# Patient Record
Sex: MALE | Race: WHITE | NOT HISPANIC OR LATINO | Employment: OTHER | URBAN - METROPOLITAN AREA
[De-identification: names, ages, dates, MRNs, and addresses within clinical notes are randomized per-mention and may not be internally consistent; named-entity substitution may affect disease eponyms.]

---

## 2017-01-17 ENCOUNTER — COMMUNICATION - HEALTHEAST (OUTPATIENT)
Dept: INTERNAL MEDICINE | Facility: CLINIC | Age: 52
End: 2017-01-17

## 2017-01-30 ENCOUNTER — OFFICE VISIT - HEALTHEAST (OUTPATIENT)
Dept: INTERNAL MEDICINE | Facility: CLINIC | Age: 52
End: 2017-01-30

## 2017-01-30 DIAGNOSIS — Z00.00 ROUTINE GENERAL MEDICAL EXAMINATION AT A HEALTH CARE FACILITY: ICD-10-CM

## 2017-01-30 LAB
CHOLEST SERPL-MCNC: 171 MG/DL
FASTING STATUS PATIENT QL REPORTED: YES
HDLC SERPL-MCNC: 37 MG/DL
LDLC SERPL CALC-MCNC: 82 MG/DL
PSA SERPL-MCNC: 1.2 NG/ML (ref 0–3.5)
TRIGL SERPL-MCNC: 262 MG/DL

## 2017-01-30 ASSESSMENT — MIFFLIN-ST. JEOR: SCORE: 2284.92

## 2017-01-31 ENCOUNTER — COMMUNICATION - HEALTHEAST (OUTPATIENT)
Dept: INTERNAL MEDICINE | Facility: CLINIC | Age: 52
End: 2017-01-31

## 2017-02-20 ENCOUNTER — COMMUNICATION - HEALTHEAST (OUTPATIENT)
Dept: INTERNAL MEDICINE | Facility: CLINIC | Age: 52
End: 2017-02-20

## 2017-03-10 ENCOUNTER — COMMUNICATION - HEALTHEAST (OUTPATIENT)
Dept: INTERNAL MEDICINE | Facility: CLINIC | Age: 52
End: 2017-03-10

## 2017-03-10 DIAGNOSIS — Q77.5: ICD-10-CM

## 2017-03-10 DIAGNOSIS — I10 HTN (HYPERTENSION): ICD-10-CM

## 2017-03-14 ENCOUNTER — COMMUNICATION - HEALTHEAST (OUTPATIENT)
Dept: INTERNAL MEDICINE | Facility: CLINIC | Age: 52
End: 2017-03-14

## 2017-03-14 DIAGNOSIS — Q77.5: ICD-10-CM

## 2017-03-22 ENCOUNTER — COMMUNICATION - HEALTHEAST (OUTPATIENT)
Dept: INTERNAL MEDICINE | Facility: CLINIC | Age: 52
End: 2017-03-22

## 2017-03-22 DIAGNOSIS — R06.81 APNEA: ICD-10-CM

## 2017-04-14 ENCOUNTER — RECORDS - HEALTHEAST (OUTPATIENT)
Dept: ADMINISTRATIVE | Facility: OTHER | Age: 52
End: 2017-04-14

## 2017-05-10 ENCOUNTER — COMMUNICATION - HEALTHEAST (OUTPATIENT)
Dept: INTERNAL MEDICINE | Facility: CLINIC | Age: 52
End: 2017-05-10

## 2017-05-19 ENCOUNTER — COMMUNICATION - HEALTHEAST (OUTPATIENT)
Dept: INTERNAL MEDICINE | Facility: CLINIC | Age: 52
End: 2017-05-19

## 2017-11-06 ENCOUNTER — COMMUNICATION - HEALTHEAST (OUTPATIENT)
Dept: INTERNAL MEDICINE | Facility: CLINIC | Age: 52
End: 2017-11-06

## 2017-11-08 ENCOUNTER — COMMUNICATION - HEALTHEAST (OUTPATIENT)
Dept: INTERNAL MEDICINE | Facility: CLINIC | Age: 52
End: 2017-11-08

## 2018-01-04 ENCOUNTER — COMMUNICATION - HEALTHEAST (OUTPATIENT)
Dept: INTERNAL MEDICINE | Facility: CLINIC | Age: 53
End: 2018-01-04

## 2018-01-05 ENCOUNTER — COMMUNICATION - HEALTHEAST (OUTPATIENT)
Dept: INTERNAL MEDICINE | Facility: CLINIC | Age: 53
End: 2018-01-05

## 2018-01-08 ENCOUNTER — RECORDS - HEALTHEAST (OUTPATIENT)
Dept: ADMINISTRATIVE | Facility: OTHER | Age: 53
End: 2018-01-08

## 2018-01-10 ENCOUNTER — COMMUNICATION - HEALTHEAST (OUTPATIENT)
Dept: INTERNAL MEDICINE | Facility: CLINIC | Age: 53
End: 2018-01-10

## 2018-01-17 ENCOUNTER — COMMUNICATION - HEALTHEAST (OUTPATIENT)
Dept: INTERNAL MEDICINE | Facility: CLINIC | Age: 53
End: 2018-01-17

## 2018-02-11 ENCOUNTER — COMMUNICATION - HEALTHEAST (OUTPATIENT)
Dept: INTERNAL MEDICINE | Facility: CLINIC | Age: 53
End: 2018-02-11

## 2018-02-11 DIAGNOSIS — E78.5 HLD (HYPERLIPIDEMIA): ICD-10-CM

## 2018-02-14 ENCOUNTER — RECORDS - HEALTHEAST (OUTPATIENT)
Dept: ADMINISTRATIVE | Facility: OTHER | Age: 53
End: 2018-02-14

## 2018-03-19 ENCOUNTER — OFFICE VISIT - HEALTHEAST (OUTPATIENT)
Dept: INTERNAL MEDICINE | Facility: CLINIC | Age: 53
End: 2018-03-19

## 2018-03-19 DIAGNOSIS — Z00.00 ROUTINE GENERAL MEDICAL EXAMINATION AT A HEALTH CARE FACILITY: ICD-10-CM

## 2018-03-19 LAB
ALBUMIN SERPL-MCNC: 4.1 G/DL (ref 3.5–5)
ALBUMIN UR-MCNC: ABNORMAL MG/DL
ALP SERPL-CCNC: 57 U/L (ref 45–120)
ALT SERPL W P-5'-P-CCNC: 19 U/L (ref 0–45)
ANION GAP SERPL CALCULATED.3IONS-SCNC: 11 MMOL/L (ref 5–18)
APPEARANCE UR: CLEAR
AST SERPL W P-5'-P-CCNC: 18 U/L (ref 0–40)
BACTERIA #/AREA URNS HPF: ABNORMAL HPF
BILIRUB SERPL-MCNC: 0.7 MG/DL (ref 0–1)
BILIRUB UR QL STRIP: ABNORMAL
BUN SERPL-MCNC: 15 MG/DL (ref 8–22)
CALCIUM SERPL-MCNC: 9.5 MG/DL (ref 8.5–10.5)
CHLORIDE BLD-SCNC: 105 MMOL/L (ref 98–107)
CHOLEST SERPL-MCNC: 168 MG/DL
CO2 SERPL-SCNC: 23 MMOL/L (ref 22–31)
COLOR UR AUTO: YELLOW
CREAT SERPL-MCNC: 1.05 MG/DL (ref 0.7–1.3)
ERYTHROCYTE [DISTWIDTH] IN BLOOD BY AUTOMATED COUNT: 11.5 % (ref 11–14.5)
FASTING STATUS PATIENT QL REPORTED: ABNORMAL
GFR SERPL CREATININE-BSD FRML MDRD: >60 ML/MIN/1.73M2
GLUCOSE BLD-MCNC: 101 MG/DL (ref 70–125)
GLUCOSE UR STRIP-MCNC: NEGATIVE MG/DL
HBA1C MFR BLD: 5.5 % (ref 3.5–6)
HCT VFR BLD AUTO: 42.5 % (ref 40–54)
HDLC SERPL-MCNC: 35 MG/DL
HGB BLD-MCNC: 14.3 G/DL (ref 14–18)
HGB UR QL STRIP: NEGATIVE
KETONES UR STRIP-MCNC: NEGATIVE MG/DL
LDLC SERPL CALC-MCNC: 76 MG/DL
LEUKOCYTE ESTERASE UR QL STRIP: NEGATIVE
MCH RBC QN AUTO: 31.5 PG (ref 27–34)
MCHC RBC AUTO-ENTMCNC: 33.7 G/DL (ref 32–36)
MCV RBC AUTO: 93 FL (ref 80–100)
MUCOUS THREADS #/AREA URNS LPF: ABNORMAL LPF
NITRATE UR QL: NEGATIVE
PH UR STRIP: 6 [PH] (ref 5–8)
PLATELET # BLD AUTO: 231 THOU/UL (ref 140–440)
PMV BLD AUTO: 7.7 FL (ref 7–10)
POTASSIUM BLD-SCNC: 4.2 MMOL/L (ref 3.5–5)
PROT SERPL-MCNC: 7.1 G/DL (ref 6–8)
PSA SERPL-MCNC: 1.9 NG/ML (ref 0–3.5)
RBC # BLD AUTO: 4.55 MILL/UL (ref 4.4–6.2)
RBC #/AREA URNS AUTO: ABNORMAL HPF
SODIUM SERPL-SCNC: 139 MMOL/L (ref 136–145)
SP GR UR STRIP: 1.02 (ref 1–1.03)
SQUAMOUS #/AREA URNS AUTO: ABNORMAL LPF
TRIGL SERPL-MCNC: 283 MG/DL
TSH SERPL DL<=0.005 MIU/L-ACNC: 1.81 UIU/ML (ref 0.3–5)
UROBILINOGEN UR STRIP-ACNC: ABNORMAL
WBC #/AREA URNS AUTO: ABNORMAL HPF
WBC: 5.7 THOU/UL (ref 4–11)

## 2018-03-19 ASSESSMENT — MIFFLIN-ST. JEOR: SCORE: 2203.85

## 2018-03-20 ENCOUNTER — COMMUNICATION - HEALTHEAST (OUTPATIENT)
Dept: INTERNAL MEDICINE | Facility: CLINIC | Age: 53
End: 2018-03-20

## 2018-04-09 ENCOUNTER — COMMUNICATION - HEALTHEAST (OUTPATIENT)
Dept: INTERNAL MEDICINE | Facility: CLINIC | Age: 53
End: 2018-04-09

## 2018-04-11 ENCOUNTER — COMMUNICATION - HEALTHEAST (OUTPATIENT)
Dept: INTERNAL MEDICINE | Facility: CLINIC | Age: 53
End: 2018-04-11

## 2018-04-16 ENCOUNTER — RECORDS - HEALTHEAST (OUTPATIENT)
Dept: ADMINISTRATIVE | Facility: OTHER | Age: 53
End: 2018-04-16

## 2018-05-11 ENCOUNTER — COMMUNICATION - HEALTHEAST (OUTPATIENT)
Dept: INTERNAL MEDICINE | Facility: CLINIC | Age: 53
End: 2018-05-11

## 2018-05-11 DIAGNOSIS — I10 HTN (HYPERTENSION): ICD-10-CM

## 2018-05-11 DIAGNOSIS — E78.5 HLD (HYPERLIPIDEMIA): ICD-10-CM

## 2018-07-30 ENCOUNTER — COMMUNICATION - HEALTHEAST (OUTPATIENT)
Dept: INTERNAL MEDICINE | Facility: CLINIC | Age: 53
End: 2018-07-30

## 2018-11-05 ENCOUNTER — COMMUNICATION - HEALTHEAST (OUTPATIENT)
Dept: INTERNAL MEDICINE | Facility: CLINIC | Age: 53
End: 2018-11-05

## 2018-12-07 ENCOUNTER — RECORDS - HEALTHEAST (OUTPATIENT)
Dept: ADMINISTRATIVE | Facility: OTHER | Age: 53
End: 2018-12-07

## 2018-12-31 ENCOUNTER — COMMUNICATION - HEALTHEAST (OUTPATIENT)
Dept: INTERNAL MEDICINE | Facility: CLINIC | Age: 53
End: 2018-12-31

## 2018-12-31 DIAGNOSIS — N52.9 ED (ERECTILE DYSFUNCTION): ICD-10-CM

## 2019-02-22 ENCOUNTER — COMMUNICATION - HEALTHEAST (OUTPATIENT)
Dept: INTERNAL MEDICINE | Facility: CLINIC | Age: 54
End: 2019-02-22

## 2019-02-22 DIAGNOSIS — E78.5 HLD (HYPERLIPIDEMIA): ICD-10-CM

## 2019-03-19 ENCOUNTER — COMMUNICATION - HEALTHEAST (OUTPATIENT)
Dept: INTERNAL MEDICINE | Facility: CLINIC | Age: 54
End: 2019-03-19

## 2019-03-19 ENCOUNTER — OFFICE VISIT - HEALTHEAST (OUTPATIENT)
Dept: INTERNAL MEDICINE | Facility: CLINIC | Age: 54
End: 2019-03-19

## 2019-03-19 DIAGNOSIS — Z00.00 ROUTINE GENERAL MEDICAL EXAMINATION AT A HEALTH CARE FACILITY: ICD-10-CM

## 2019-03-19 LAB
ALBUMIN SERPL-MCNC: 4.3 G/DL (ref 3.5–5)
ALBUMIN UR-MCNC: NEGATIVE MG/DL
ALP SERPL-CCNC: 61 U/L (ref 45–120)
ALT SERPL W P-5'-P-CCNC: 16 U/L (ref 0–45)
ANION GAP SERPL CALCULATED.3IONS-SCNC: 11 MMOL/L (ref 5–18)
APPEARANCE UR: CLEAR
AST SERPL W P-5'-P-CCNC: 18 U/L (ref 0–40)
BILIRUB SERPL-MCNC: 0.7 MG/DL (ref 0–1)
BILIRUB UR QL STRIP: NEGATIVE
BUN SERPL-MCNC: 17 MG/DL (ref 8–22)
CALCIUM SERPL-MCNC: 9.6 MG/DL (ref 8.5–10.5)
CHLORIDE BLD-SCNC: 103 MMOL/L (ref 98–107)
CHOLEST SERPL-MCNC: 199 MG/DL
CO2 SERPL-SCNC: 25 MMOL/L (ref 22–31)
COLOR UR AUTO: YELLOW
CREAT SERPL-MCNC: 1.07 MG/DL (ref 0.7–1.3)
ERYTHROCYTE [DISTWIDTH] IN BLOOD BY AUTOMATED COUNT: 11.7 % (ref 11–14.5)
FASTING STATUS PATIENT QL REPORTED: YES
GFR SERPL CREATININE-BSD FRML MDRD: >60 ML/MIN/1.73M2
GLUCOSE BLD-MCNC: 107 MG/DL (ref 70–125)
GLUCOSE UR STRIP-MCNC: NEGATIVE MG/DL
HCT VFR BLD AUTO: 44.1 % (ref 40–54)
HDLC SERPL-MCNC: 42 MG/DL
HGB BLD-MCNC: 15.4 G/DL (ref 14–18)
HGB UR QL STRIP: NEGATIVE
KETONES UR STRIP-MCNC: NEGATIVE MG/DL
LDLC SERPL CALC-MCNC: 105 MG/DL
LEUKOCYTE ESTERASE UR QL STRIP: NEGATIVE
MCH RBC QN AUTO: 31.9 PG (ref 27–34)
MCHC RBC AUTO-ENTMCNC: 35 G/DL (ref 32–36)
MCV RBC AUTO: 91 FL (ref 80–100)
NITRATE UR QL: NEGATIVE
PH UR STRIP: 7.5 [PH] (ref 5–8)
PLATELET # BLD AUTO: 260 THOU/UL (ref 140–440)
PMV BLD AUTO: 7.7 FL (ref 7–10)
POTASSIUM BLD-SCNC: 4 MMOL/L (ref 3.5–5)
PROT SERPL-MCNC: 7.3 G/DL (ref 6–8)
PSA SERPL-MCNC: 2.1 NG/ML (ref 0–3.5)
RBC # BLD AUTO: 4.84 MILL/UL (ref 4.4–6.2)
SODIUM SERPL-SCNC: 139 MMOL/L (ref 136–145)
SP GR UR STRIP: 1.02 (ref 1–1.03)
TRIGL SERPL-MCNC: 259 MG/DL
TSH SERPL DL<=0.005 MIU/L-ACNC: 1.78 UIU/ML (ref 0.3–5)
UROBILINOGEN UR STRIP-ACNC: NORMAL
WBC: 6.2 THOU/UL (ref 4–11)

## 2019-03-19 ASSESSMENT — MIFFLIN-ST. JEOR: SCORE: 2171.52

## 2019-03-21 ENCOUNTER — COMMUNICATION - HEALTHEAST (OUTPATIENT)
Dept: INTERNAL MEDICINE | Facility: CLINIC | Age: 54
End: 2019-03-21

## 2019-03-21 DIAGNOSIS — N52.9 ED (ERECTILE DYSFUNCTION): ICD-10-CM

## 2019-03-25 ENCOUNTER — COMMUNICATION - HEALTHEAST (OUTPATIENT)
Dept: INTERNAL MEDICINE | Facility: CLINIC | Age: 54
End: 2019-03-25

## 2019-03-25 ENCOUNTER — AMBULATORY - HEALTHEAST (OUTPATIENT)
Dept: INTERNAL MEDICINE | Facility: CLINIC | Age: 54
End: 2019-03-25

## 2019-03-25 DIAGNOSIS — R19.7 DIARRHEA: ICD-10-CM

## 2019-04-16 ENCOUNTER — RECORDS - HEALTHEAST (OUTPATIENT)
Dept: ADMINISTRATIVE | Facility: OTHER | Age: 54
End: 2019-04-16

## 2019-04-23 ENCOUNTER — COMMUNICATION - HEALTHEAST (OUTPATIENT)
Dept: INTERNAL MEDICINE | Facility: CLINIC | Age: 54
End: 2019-04-23

## 2019-04-24 ENCOUNTER — COMMUNICATION - HEALTHEAST (OUTPATIENT)
Dept: INTERNAL MEDICINE | Facility: CLINIC | Age: 54
End: 2019-04-24

## 2019-04-24 DIAGNOSIS — N52.9 ED (ERECTILE DYSFUNCTION): ICD-10-CM

## 2019-05-17 ENCOUNTER — COMMUNICATION - HEALTHEAST (OUTPATIENT)
Dept: INTERNAL MEDICINE | Facility: CLINIC | Age: 54
End: 2019-05-17

## 2019-05-17 DIAGNOSIS — N52.9 ED (ERECTILE DYSFUNCTION): ICD-10-CM

## 2019-05-18 ENCOUNTER — COMMUNICATION - HEALTHEAST (OUTPATIENT)
Dept: INTERNAL MEDICINE | Facility: CLINIC | Age: 54
End: 2019-05-18

## 2019-05-18 DIAGNOSIS — N52.9 ED (ERECTILE DYSFUNCTION): ICD-10-CM

## 2019-05-20 ENCOUNTER — COMMUNICATION - HEALTHEAST (OUTPATIENT)
Dept: INTERNAL MEDICINE | Facility: CLINIC | Age: 54
End: 2019-05-20

## 2019-05-20 DIAGNOSIS — N52.9 ED (ERECTILE DYSFUNCTION): ICD-10-CM

## 2019-05-28 ENCOUNTER — COMMUNICATION - HEALTHEAST (OUTPATIENT)
Dept: INTERNAL MEDICINE | Facility: CLINIC | Age: 54
End: 2019-05-28

## 2019-05-28 DIAGNOSIS — E78.5 HLD (HYPERLIPIDEMIA): ICD-10-CM

## 2019-05-28 DIAGNOSIS — I10 HTN (HYPERTENSION): ICD-10-CM

## 2019-07-20 ENCOUNTER — COMMUNICATION - HEALTHEAST (OUTPATIENT)
Dept: INTERNAL MEDICINE | Facility: CLINIC | Age: 54
End: 2019-07-20

## 2019-07-20 DIAGNOSIS — N52.9 ED (ERECTILE DYSFUNCTION): ICD-10-CM

## 2019-08-23 ENCOUNTER — RECORDS - HEALTHEAST (OUTPATIENT)
Dept: ADMINISTRATIVE | Facility: OTHER | Age: 54
End: 2019-08-23

## 2019-09-24 ENCOUNTER — COMMUNICATION - HEALTHEAST (OUTPATIENT)
Dept: INTERNAL MEDICINE | Facility: CLINIC | Age: 54
End: 2019-09-24

## 2019-09-24 DIAGNOSIS — N52.9 ED (ERECTILE DYSFUNCTION): ICD-10-CM

## 2019-10-31 ENCOUNTER — AMBULATORY - HEALTHEAST (OUTPATIENT)
Dept: INTERNAL MEDICINE | Facility: CLINIC | Age: 54
End: 2019-10-31

## 2019-10-31 DIAGNOSIS — Z00.00 ROUTINE GENERAL MEDICAL EXAMINATION AT A HEALTH CARE FACILITY: ICD-10-CM

## 2019-11-12 ENCOUNTER — RECORDS - HEALTHEAST (OUTPATIENT)
Dept: ADMINISTRATIVE | Facility: OTHER | Age: 54
End: 2019-11-12

## 2019-12-20 ENCOUNTER — COMMUNICATION - HEALTHEAST (OUTPATIENT)
Dept: INTERNAL MEDICINE | Facility: CLINIC | Age: 54
End: 2019-12-20

## 2019-12-20 DIAGNOSIS — N52.9 ED (ERECTILE DYSFUNCTION): ICD-10-CM

## 2020-02-10 ENCOUNTER — COMMUNICATION - HEALTHEAST (OUTPATIENT)
Dept: INTERNAL MEDICINE | Facility: CLINIC | Age: 55
End: 2020-02-10

## 2020-02-10 DIAGNOSIS — E78.5 HLD (HYPERLIPIDEMIA): ICD-10-CM

## 2020-02-13 ENCOUNTER — COMMUNICATION - HEALTHEAST (OUTPATIENT)
Dept: INTERNAL MEDICINE | Facility: CLINIC | Age: 55
End: 2020-02-13

## 2020-02-13 DIAGNOSIS — E78.5 HLD (HYPERLIPIDEMIA): ICD-10-CM

## 2020-03-20 ENCOUNTER — COMMUNICATION - HEALTHEAST (OUTPATIENT)
Dept: INTERNAL MEDICINE | Facility: CLINIC | Age: 55
End: 2020-03-20

## 2020-03-20 DIAGNOSIS — I10 HTN (HYPERTENSION): ICD-10-CM

## 2020-03-23 ENCOUNTER — COMMUNICATION - HEALTHEAST (OUTPATIENT)
Dept: INTERNAL MEDICINE | Facility: CLINIC | Age: 55
End: 2020-03-23

## 2020-03-23 DIAGNOSIS — E78.5 HLD (HYPERLIPIDEMIA): ICD-10-CM

## 2020-03-26 ENCOUNTER — COMMUNICATION - HEALTHEAST (OUTPATIENT)
Dept: INTERNAL MEDICINE | Facility: CLINIC | Age: 55
End: 2020-03-26

## 2020-03-26 DIAGNOSIS — I10 HTN (HYPERTENSION): ICD-10-CM

## 2020-05-27 ENCOUNTER — RECORDS - HEALTHEAST (OUTPATIENT)
Dept: ADMINISTRATIVE | Facility: OTHER | Age: 55
End: 2020-05-27

## 2020-06-26 ENCOUNTER — COMMUNICATION - HEALTHEAST (OUTPATIENT)
Dept: INTERNAL MEDICINE | Facility: CLINIC | Age: 55
End: 2020-06-26

## 2020-07-23 ENCOUNTER — AMBULATORY - HEALTHEAST (OUTPATIENT)
Dept: FAMILY MEDICINE | Facility: CLINIC | Age: 55
End: 2020-07-23

## 2020-07-23 DIAGNOSIS — Z20.822 SUSPECTED COVID-19 VIRUS INFECTION: ICD-10-CM

## 2020-07-24 ENCOUNTER — COMMUNICATION - HEALTHEAST (OUTPATIENT)
Dept: FAMILY MEDICINE | Facility: CLINIC | Age: 55
End: 2020-07-24

## 2020-07-30 ENCOUNTER — OFFICE VISIT - HEALTHEAST (OUTPATIENT)
Dept: INTERNAL MEDICINE | Facility: CLINIC | Age: 55
End: 2020-07-30

## 2020-07-30 DIAGNOSIS — U07.1 2019 NOVEL CORONAVIRUS DISEASE (COVID-19): ICD-10-CM

## 2020-09-08 ENCOUNTER — COMMUNICATION - HEALTHEAST (OUTPATIENT)
Dept: INTERNAL MEDICINE | Facility: CLINIC | Age: 55
End: 2020-09-08

## 2020-09-08 DIAGNOSIS — N52.9 ED (ERECTILE DYSFUNCTION): ICD-10-CM

## 2020-09-14 ENCOUNTER — OFFICE VISIT - HEALTHEAST (OUTPATIENT)
Dept: INTERNAL MEDICINE | Facility: CLINIC | Age: 55
End: 2020-09-14

## 2020-09-14 DIAGNOSIS — Z00.00 ROUTINE GENERAL MEDICAL EXAMINATION AT A HEALTH CARE FACILITY: ICD-10-CM

## 2020-09-14 LAB
ALBUMIN SERPL-MCNC: 4.1 G/DL (ref 3.5–5)
ALBUMIN UR-MCNC: NEGATIVE MG/DL
ALP SERPL-CCNC: 68 U/L (ref 45–120)
ALT SERPL W P-5'-P-CCNC: 22 U/L (ref 0–45)
ANION GAP SERPL CALCULATED.3IONS-SCNC: 11 MMOL/L (ref 5–18)
APPEARANCE UR: CLEAR
AST SERPL W P-5'-P-CCNC: 17 U/L (ref 0–40)
BILIRUB SERPL-MCNC: 0.7 MG/DL (ref 0–1)
BILIRUB UR QL STRIP: NEGATIVE
BUN SERPL-MCNC: 15 MG/DL (ref 8–22)
CALCIUM SERPL-MCNC: 9.6 MG/DL (ref 8.5–10.5)
CHLORIDE BLD-SCNC: 105 MMOL/L (ref 98–107)
CHOLEST SERPL-MCNC: 181 MG/DL
CO2 SERPL-SCNC: 23 MMOL/L (ref 22–31)
COLOR UR AUTO: YELLOW
CREAT SERPL-MCNC: 1 MG/DL (ref 0.7–1.3)
ERYTHROCYTE [DISTWIDTH] IN BLOOD BY AUTOMATED COUNT: 12 % (ref 11–14.5)
FASTING STATUS PATIENT QL REPORTED: ABNORMAL
GFR SERPL CREATININE-BSD FRML MDRD: >60 ML/MIN/1.73M2
GLUCOSE BLD-MCNC: 102 MG/DL (ref 70–125)
GLUCOSE UR STRIP-MCNC: NEGATIVE MG/DL
HCT VFR BLD AUTO: 45.3 % (ref 40–54)
HDLC SERPL-MCNC: 40 MG/DL
HGB BLD-MCNC: 15.3 G/DL (ref 14–18)
HGB UR QL STRIP: NEGATIVE
KETONES UR STRIP-MCNC: NEGATIVE MG/DL
LDLC SERPL CALC-MCNC: 71 MG/DL
LEUKOCYTE ESTERASE UR QL STRIP: NEGATIVE
MCH RBC QN AUTO: 31.6 PG (ref 27–34)
MCHC RBC AUTO-ENTMCNC: 33.7 G/DL (ref 32–36)
MCV RBC AUTO: 94 FL (ref 80–100)
NITRATE UR QL: NEGATIVE
PH UR STRIP: 5.5 [PH] (ref 5–8)
PLATELET # BLD AUTO: 223 THOU/UL (ref 140–440)
PMV BLD AUTO: 7.8 FL (ref 7–10)
POTASSIUM BLD-SCNC: 4.3 MMOL/L (ref 3.5–5)
PROT SERPL-MCNC: 6.9 G/DL (ref 6–8)
PSA SERPL-MCNC: 2.7 NG/ML (ref 0–3.5)
RBC # BLD AUTO: 4.83 MILL/UL (ref 4.4–6.2)
SODIUM SERPL-SCNC: 139 MMOL/L (ref 136–145)
SP GR UR STRIP: 1.02 (ref 1–1.03)
TRIGL SERPL-MCNC: 350 MG/DL
TSH SERPL DL<=0.005 MIU/L-ACNC: 2.5 UIU/ML (ref 0.3–5)
UROBILINOGEN UR STRIP-ACNC: NORMAL
WBC: 8.2 THOU/UL (ref 4–11)

## 2020-09-14 ASSESSMENT — MIFFLIN-ST. JEOR: SCORE: 2257.7

## 2020-09-15 ENCOUNTER — COMMUNICATION - HEALTHEAST (OUTPATIENT)
Dept: INTERNAL MEDICINE | Facility: CLINIC | Age: 55
End: 2020-09-15

## 2020-10-30 ENCOUNTER — RECORDS - HEALTHEAST (OUTPATIENT)
Dept: ADMINISTRATIVE | Facility: OTHER | Age: 55
End: 2020-10-30

## 2021-01-14 ENCOUNTER — COMMUNICATION - HEALTHEAST (OUTPATIENT)
Dept: INTERNAL MEDICINE | Facility: CLINIC | Age: 56
End: 2021-01-14

## 2021-01-14 DIAGNOSIS — N52.9 ED (ERECTILE DYSFUNCTION): ICD-10-CM

## 2021-02-19 ENCOUNTER — COMMUNICATION - HEALTHEAST (OUTPATIENT)
Dept: INTERNAL MEDICINE | Facility: CLINIC | Age: 56
End: 2021-02-19

## 2021-02-19 DIAGNOSIS — N52.9 ED (ERECTILE DYSFUNCTION): ICD-10-CM

## 2021-05-04 ENCOUNTER — COMMUNICATION - HEALTHEAST (OUTPATIENT)
Dept: SCHEDULING | Facility: CLINIC | Age: 56
End: 2021-05-04

## 2021-05-04 DIAGNOSIS — I10 HTN (HYPERTENSION): ICD-10-CM

## 2021-05-24 ENCOUNTER — COMMUNICATION - HEALTHEAST (OUTPATIENT)
Dept: INTERNAL MEDICINE | Facility: CLINIC | Age: 56
End: 2021-05-24

## 2021-05-24 DIAGNOSIS — E78.5 HLD (HYPERLIPIDEMIA): ICD-10-CM

## 2021-05-26 NOTE — TELEPHONE ENCOUNTER
sildenafil (VIAGRA) 100 MG tablet (Discontinued) 15 tablet 1 12/31/2018 3/19/2019 No   Sig: TAKE 1/4 TO 1/2 TABLET BY MOUTH DAILY AS NEEDED FOR ERECTILE DYSFUNCTION   Sent to pharmacy as: sildenafil (VIAGRA) 100 MG tablet   Reason for Discontinue: Therapy completed     Monserrat Cyr RN Care Connection Triage/Medication Refill

## 2021-05-27 ENCOUNTER — RECORDS - HEALTHEAST (OUTPATIENT)
Dept: ADMINISTRATIVE | Facility: CLINIC | Age: 56
End: 2021-05-27

## 2021-05-27 NOTE — TELEPHONE ENCOUNTER
Question following Office Visit  When did you see your provider: 3/19/2019  What is your question: Patient calling to state that the diarrhea has not subsided. He stated that he has had 1 solid stool within the past 2 weeks. What is the next step? Should patient return to clinic for further testing? Go to [facility]? Is there a medication that he can take?    Patient states that it triggers when he eats and he has tried OTC Imodium with minimal relief    Pharmacy: [facility] [place]    Okay to leave a detailed message: Yes

## 2021-05-27 NOTE — TELEPHONE ENCOUNTER
Spoke with the patient and relayed message below from Dr. Perry.  Patient verbalized understanding and had no further questions at this time.      Referral has been placed in a separate encounter for Dr. Perry to review.  Lynda LUIS, VALENTIN/CMT....................10:18 AM

## 2021-05-27 NOTE — TELEPHONE ENCOUNTER
Patient needs consultation with [facility]; in the meantime the patient should try Metamucil 1 heaping tablespoon daily plus Imodium AD 1 tablet twice daily as needed-- both are over-the-counter.  Metamucil is taken with water daily at the same time.  Each day.    The patient should follow a diet of bananas rice applesauce and tea and stay away from milk and dairy products and caffeine.    Please enter the order for [facility] consult regarding diarrhea.    Please call patient and advised him

## 2021-05-28 NOTE — TELEPHONE ENCOUNTER
Gastroenterology Order Update:    [facility] has left multiple messages for patient. A contact letter has also been sent to patient's home. No response from patient. We will defer this order at this time in case patient wants to schedule in the future.     No additional action is currently needed.    Thank you

## 2021-05-29 ENCOUNTER — RECORDS - HEALTHEAST (OUTPATIENT)
Dept: ADMINISTRATIVE | Facility: CLINIC | Age: 56
End: 2021-05-29

## 2021-05-29 NOTE — TELEPHONE ENCOUNTER
Refill Approved    Rx renewed per Medication Renewal Policy. Medication was last renewed on 5/12/18.2/23/19    Monserrat Cyr, [facility] Triage/Med Refill 5/28/2019     Requested Prescriptions   Pending Prescriptions Disp Refills     simvastatin (ZOCOR) 20 MG tablet [Pharmacy Med Name: SIMVASTATIN 20MG TABLETS] 90 tablet 0     Sig: TAKE 1 TABLET(20 MG) BY MOUTH AT BEDTIME       Statins Refill Protocol (Hmg CoA Reductase Inhibitors) Passed - 5/28/2019  6:56 PM        Passed - PCP or prescribing provider visit in past 12 months      Last office visit with prescriber/PCP: 1/30/2017 Rafael Perry MD OR same dept: Visit date not found OR same specialty: 1/30/2017 Rafael Perry MD  Last physical: 3/19/2019 Last MTM visit: Visit date not found   Next visit within 3 mo: Visit date not found  Next physical within 3 mo: Visit date not found  Prescriber OR PCP: Rafael Perry MD  Last diagnosis associated with med order: 1. HLD (hyperlipidemia)  - simvastatin (ZOCOR) 20 MG tablet [Pharmacy Med Name: SIMVASTATIN 20MG TABLETS]; TAKE 1 TABLET(20 MG) BY MOUTH AT BEDTIME  Dispense: 90 tablet; Refill: 0    2. HTN (hypertension)  - losartan-hydrochlorothiazide (HYZAAR) 100-25 mg per tablet [Pharmacy Med Name: LOSARTAN/HCTZ 100/25MG TABLETS]; TAKE 1 TABLET BY MOUTH DAILY  Dispense: 90 tablet; Refill: 0    If protocol passes may refill for 12 months if within 3 months of last provider visit (or a total of 15 months).             losartan-hydrochlorothiazide (HYZAAR) 100-25 mg per tablet [Pharmacy Med Name: LOSARTAN/HCTZ 100/25MG TABLETS] 90 tablet 0     Sig: TAKE 1 TABLET BY MOUTH DAILY       Diuretics/Combination Diuretics Refill Protocol  Passed - 5/28/2019  6:56 PM        Passed - Visit with PCP or prescribing provider visit in past 12 months     Last office visit with prescriber/PCP: 1/30/2017 Rafael Perry MD OR same dept: Visit date not found OR same specialty: 1/30/2017 Rafael Perry MD   Last physical: 3/19/2019 Last MTM visit: Visit date not found   Next visit within 3 mo: Visit date not found  Next physical within 3 mo: Visit date not found  Prescriber OR PCP: Rafael Perry MD  Last diagnosis associated with med order: 1. HLD (hyperlipidemia)  - simvastatin (ZOCOR) 20 MG tablet [Pharmacy Med Name: SIMVASTATIN 20MG TABLETS]; TAKE 1 TABLET(20 MG) BY MOUTH AT BEDTIME  Dispense: 90 tablet; Refill: 0    2. HTN (hypertension)  - losartan-hydrochlorothiazide (HYZAAR) 100-25 mg per tablet [Pharmacy Med Name: LOSARTAN/HCTZ 100/25MG TABLETS]; TAKE 1 TABLET BY MOUTH DAILY  Dispense: 90 tablet; Refill: 0    If protocol passes may refill for 12 months if within 3 months of last provider visit (or a total of 15 months).             Passed - Serum Potassium in past 12 months      Lab Results   Component Value Date    Potassium 4.0 03/19/2019             Passed - Serum Sodium in past 12 months      Lab Results   Component Value Date    Sodium 139 03/19/2019             Passed - Blood pressure on file in past 12 months     BP Readings from Last 1 Encounters:   03/19/19 122/82             Passed - Serum Creatinine in past 12 months      Creatinine   Date Value Ref Range Status   03/19/2019 1.07 0.70 - 1.30 mg/dL Final

## 2021-05-30 VITALS — HEIGHT: 75 IN | WEIGHT: 301 LBS | BODY MASS INDEX: 37.42 KG/M2

## 2021-05-30 NOTE — TELEPHONE ENCOUNTER
Refill Approved    Rx renewed per Medication Renewal Policy. Medication was last renewed on 5/21/19.  OV 3/19/19  Hollie Floyd, [facility] Triage/Med Refill 7/20/2019     Requested Prescriptions   Pending Prescriptions Disp Refills     sildenafil (VIAGRA) 100 MG tablet [Pharmacy Med Name: SILDENAFIL CITRATE 100 MG T 100 TAB] 15 tablet 1     Sig: TAKE 1/4 TO 1/2 TABLET BY MOUTH DAILY AS NEEDED FOR ERECTILE DYSFUNCTION       Medications for Impotence Refill Protocol Passed - 7/20/2019 10:07 AM        Passed - PCP or prescribing provider visit in last year     Last office visit with prescriber/PCP: 1/30/2017 Rafael Perry MD OR same dept: Visit date not found OR same specialty: 1/30/2017 Rafael Perry MD  Last physical: 3/19/2019 Last MTM visit: Visit date not found   Next visit within 3 mo: Visit date not found  Next physical within 3 mo: Visit date not found  Prescriber OR PCP: Rafael Perry MD  Last diagnosis associated with med order: 1. ED (erectile dysfunction)  - sildenafil (VIAGRA) 100 MG tablet [Pharmacy Med Name: SILDENAFIL CITRATE 100 MG T 100 TAB]; TAKE 1/4 TO 1/2 TABLET BY MOUTH DAILY AS NEEDED FOR ERECTILE DYSFUNCTION  Dispense: 15 tablet; Refill: 1    If protocol passes may refill for 12 months if within 3 months of last provider visit (or a total of 15 months).

## 2021-06-01 VITALS — BODY MASS INDEX: 35.31 KG/M2 | WEIGHT: 284 LBS | HEIGHT: 75 IN

## 2021-06-01 NOTE — TELEPHONE ENCOUNTER
Refill Approved    Rx renewed per Medication Renewal Policy. Medication was last renewed on 7/20/19.    Monserrat Cyr, [facility] Triage/Med Refill 9/25/2019     Requested Prescriptions   Pending Prescriptions Disp Refills     sildenafil (VIAGRA) 100 MG tablet [Pharmacy Med Name: SILDENAFIL CITRATE 100 MG T 100 TAB] 15 tablet 0     Sig: TAKE 1/4 TO 1/2 TABLET BY MOUTH DAILY AS NEEDED FOR ERECTILE DYSFUNCTION       Medications for Impotence Refill Protocol Passed - 9/24/2019  7:05 PM        Passed - PCP or prescribing provider visit in last year     Last office visit with prescriber/PCP: 1/30/2017 Rafael Perry MD OR same dept: Visit date not found OR same specialty: 1/30/2017 Rafael Perry MD  Last physical: 3/19/2019 Last MTM visit: Visit date not found   Next visit within 3 mo: Visit date not found  Next physical within 3 mo: Visit date not found  Prescriber OR PCP: Rafael Perry MD  Last diagnosis associated with med order: 1. ED (erectile dysfunction)  - sildenafil (VIAGRA) 100 MG tablet [Pharmacy Med Name: SILDENAFIL CITRATE 100 MG T 100 TAB]; TAKE 1/4 TO 1/2 TABLET BY MOUTH DAILY AS NEEDED FOR ERECTILE DYSFUNCTION  Dispense: 15 tablet; Refill: 0    If protocol passes may refill for 12 months if within 3 months of last provider visit (or a total of 15 months).

## 2021-06-02 VITALS — WEIGHT: 276 LBS | BODY MASS INDEX: 34.32 KG/M2 | HEIGHT: 75 IN

## 2021-06-04 NOTE — TELEPHONE ENCOUNTER
Refill Approved    Rx renewed per Medication Renewal Policy. Medication was last renewed on 9/25/19.    Monserrat Cyr, [facility] Triage/Med Refill 12/23/2019     Requested Prescriptions   Pending Prescriptions Disp Refills     sildenafil (VIAGRA) 100 MG tablet [Pharmacy Med Name: SILDENAFIL CITRATE 100 MG T 100 TAB] 15 tablet 3     Sig: TAKE 1/4 TO 1/2 TABLET BY MOUTH DAILY AS NEEDED FOR ERECTILE DYSFUNCTION       Medications for Impotence Refill Protocol Passed - 12/20/2019  4:49 PM        Passed - PCP or prescribing provider visit in last year     Last office visit with prescriber/PCP: 1/30/2017 Rafael Perry MD OR same dept: Visit date not found OR same specialty: 1/30/2017 Rafael Perry MD  Last physical: 3/19/2019 Last MTM visit: Visit date not found   Next visit within 3 mo: Visit date not found  Next physical within 3 mo: Visit date not found  Prescriber OR PCP: Rafael Perry MD  Last diagnosis associated with med order: 1. ED (erectile dysfunction)  - sildenafil (VIAGRA) 100 MG tablet [Pharmacy Med Name: SILDENAFIL CITRATE 100 MG T 100 TAB]; TAKE 1/4 TO 1/2 TABLET BY MOUTH DAILY AS NEEDED FOR ERECTILE DYSFUNCTION  Dispense: 15 tablet; Refill: 3    If protocol passes may refill for 12 months if within 3 months of last provider visit (or a total of 15 months).

## 2021-06-05 VITALS
HEART RATE: 76 BPM | WEIGHT: 295 LBS | SYSTOLIC BLOOD PRESSURE: 124 MMHG | BODY MASS INDEX: 36.68 KG/M2 | DIASTOLIC BLOOD PRESSURE: 80 MMHG | TEMPERATURE: 98 F | OXYGEN SATURATION: 96 % | HEIGHT: 75 IN

## 2021-06-06 NOTE — TELEPHONE ENCOUNTER
This is the 2 request for this medication. Patient is leaving town today for a few days and asking to get this refilled today asap!    Refill Request  Did you contact pharmacy: Yes  Medication name:   Requested Prescriptions     Pending Prescriptions Disp Refills     simvastatin (ZOCOR) 20 MG tablet [Pharmacy Med Name: SIMVASTATIN 20MG TABLETS] 90 tablet 0     Sig: TAKE 1 TABLET(20 MG) BY MOUTH AT BEDTIME     Who prescribed the medication: Rafael Perry MD  Requested Pharmacy: [facility]  Is patient out of medication: Yes  Patient notified refills processed in 3 business days:  yes  Okay to leave a detailed message: yes

## 2021-06-06 NOTE — TELEPHONE ENCOUNTER
Refill Approved    Rx renewed per Medication Renewal Policy. Medication was last renewed on 5/28/2019.  Last OV 3/19/2019.    Belem Nelson, [facility] Connection Triage/Med Refill 2/13/2020     Requested Prescriptions   Pending Prescriptions Disp Refills     simvastatin (ZOCOR) 20 MG tablet [Pharmacy Med Name: SIMVASTATIN 20MG TABLETS] 90 tablet 0     Sig: TAKE 1 TABLET(20 MG) BY MOUTH AT BEDTIME       Statins Refill Protocol (Hmg CoA Reductase Inhibitors) Passed - 2/13/2020  1:57 PM        Passed - PCP or prescribing provider visit in past 12 months      Last office visit with prescriber/PCP: 1/30/2017 Rafael Perry MD OR same dept: Visit date not found OR same specialty: 1/30/2017 Rafael Perry MD  Last physical: 3/19/2019 Last MTM visit: Visit date not found   Next visit within 3 mo: Visit date not found  Next physical within 3 mo: Visit date not found  Prescriber OR PCP: Rafael Perry MD  Last diagnosis associated with med order: 1. HLD (hyperlipidemia)  - simvastatin (ZOCOR) 20 MG tablet [Pharmacy Med Name: SIMVASTATIN 20MG TABLETS]; TAKE 1 TABLET(20 MG) BY MOUTH AT BEDTIME  Dispense: 90 tablet; Refill: 0    If protocol passes may refill for 12 months if within 3 months of last provider visit (or a total of 15 months).

## 2021-06-07 ENCOUNTER — COMMUNICATION - HEALTHEAST (OUTPATIENT)
Dept: INTERNAL MEDICINE | Facility: CLINIC | Age: 56
End: 2021-06-07

## 2021-06-07 DIAGNOSIS — N52.9 ED (ERECTILE DYSFUNCTION): ICD-10-CM

## 2021-06-07 NOTE — TELEPHONE ENCOUNTER
Patient with incontinence coming in for a consult. Chart reviewed and all records available. Pt states he does not use urinary pad/briefs.     RN cannot approve Refill Request    RN can NOT refill this medication Protocol failed and NO refill given.      Monserrat Cyr, [facility] Triage/Med Refill 3/24/2020    Requested Prescriptions   Pending Prescriptions Disp Refills     simvastatin (ZOCOR) 20 MG tablet [Pharmacy Med Name: SIMVASTATIN 20MG TABLETS] 90 tablet 0     Sig: TAKE 1 TABLET BY MOUTH AT BEDTIME       Statins Refill Protocol (Hmg CoA Reductase Inhibitors) Failed - 3/23/2020  1:07 PM        Failed - PCP or prescribing provider visit in past 12 months      Last office visit with prescriber/PCP: 1/30/2017 Rafael Perry MD OR same dept: Visit date not found OR same specialty: 1/30/2017 Rafael Perry MD  Last physical: 3/19/2019 Last MTM visit: Visit date not found   Next visit within 3 mo: Visit date not found  Next physical within 3 mo: Visit date not found  Prescriber OR PCP: Rafael Perry MD  Last diagnosis associated with med order: 1. HLD (hyperlipidemia)  - simvastatin (ZOCOR) 20 MG tablet [Pharmacy Med Name: SIMVASTATIN 20MG TABLETS]; TAKE 1 TABLET BY MOUTH AT BEDTIME  Dispense: 90 tablet; Refill: 0    If protocol passes may refill for 12 months if within 3 months of last provider visit (or a total of 15 months).

## 2021-06-07 NOTE — TELEPHONE ENCOUNTER
RN cannot approve Refill Request    RN can NOT refill this medication Protocol failed and NO refill given.      Monserrat Cyr, [facility] Triage/Med Refill 3/20/2020    Requested Prescriptions   Pending Prescriptions Disp Refills     losartan-hydrochlorothiazide (HYZAAR) 100-25 mg per tablet [Pharmacy Med Name: LOSARTAN/HCTZ 100/25MG TABLETS] 90 tablet 3     Sig: TAKE 1 TABLET BY MOUTH DAILY       Diuretics/Combination Diuretics Refill Protocol  Failed - 3/20/2020 11:04 AM        Failed - Visit with PCP or prescribing provider visit in past 12 months     Last office visit with prescriber/PCP: 1/30/2017 Rafael Perry MD OR same dept: Visit date not found OR same specialty: 1/30/2017 Rafael Perry MD  Last physical: 3/19/2019 Last MTM visit: Visit date not found   Next visit within 3 mo: Visit date not found  Next physical within 3 mo: Visit date not found  Prescriber OR PCP: Rafael Perry MD  Last diagnosis associated with med order: 1. HTN (hypertension)  - losartan-hydrochlorothiazide (HYZAAR) 100-25 mg per tablet [Pharmacy Med Name: LOSARTAN/HCTZ 100/25MG TABLETS]; TAKE 1 TABLET BY MOUTH DAILY  Dispense: 90 tablet; Refill: 2    If protocol passes may refill for 12 months if within 3 months of last provider visit (or a total of 15 months).             Failed - Serum Potassium in past 12 months      No results found for: LN-POTASSIUM          Failed - Serum Sodium in past 12 months      No results found for: LN-SODIUM          Failed - Blood pressure on file in past 12 months     BP Readings from Last 1 Encounters:   03/19/19 122/82             Failed - Serum Creatinine in past 12 months      Creatinine   Date Value Ref Range Status   03/19/2019 1.07 0.70 - 1.30 mg/dL Final

## 2021-06-07 NOTE — TELEPHONE ENCOUNTER
Medication Request  Medication name:    Disp  Refills  Start  End     losartan-hydrochlorothiazide (HYZAAR) 100-25 mg per tablet  90 tablet  3  3/21/2020      Sig: TAKE 1 TABLET BY MOUTH DAILY     Sent to pharmacy as: losartan 100 mg-hydrochlorothiazide 25 mg tablet (HYZAAR)     E-Prescribing Status: Receipt confirmed by pharmacy (3/21/2020 11:29 AM CDT)         [id number]  Reason for request: caller stated that she don't remember where she had place patient's medication. Caller thinks she had tossed it out but isnt sure. Caller stated that she is still looking for it but would really like for Rafael Perry MD to send over another one if possible. Caller is so concerned that she is requesting a call back form Rafael Perry MD nurse therefore she can re-explain herself on how she thinks she lost the medication and is really needing for patient to take them today because its been 2 days that he hasnt taken the medication.   When did you use medication last?:    Patient offered appointment:  patient declined  Okay to leave a detailed message: yes

## 2021-06-08 ENCOUNTER — RECORDS - HEALTHEAST (OUTPATIENT)
Dept: ADMINISTRATIVE | Facility: OTHER | Age: 56
End: 2021-06-08

## 2021-06-08 NOTE — PROGRESS NOTES
Office Visit - Physical    Marquis Martínez   52 y.o. male    Date of Visit: 1/30/2017    Chief Complaint   Patient presents with     Annual Exam     Physical Exam   fasting     Urinary Frequency       Subjective: Physical.    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    6 beers per week.    Allergies include amlodipine and lisinopril HCTZ.        ROS: A comprehensive review of systems was performed and was otherwise negative    Medications:   Prior to Admission medications    Medication Sig Start Date End Date Taking? Authorizing Provider   losartan-hydrochlorothiazide (HYZAAR) 100-25 mg per tablet TAKE ONE TABLET BY MOUTH DAILY 12/17/15  Yes Pankaj Horowitz MD   simvastatin (ZOCOR) 20 MG tablet TAKE ONE TABLET AT BEDTIME 1/17/17  Yes Pankaj Horowitz MD   tadalafil (CIALIS) 5 MG tablet TAKE ONE TABLET AS DIRECTED 3/4/16  Yes Rob Goldsmith MD   sildenafil (VIAGRA) 100 MG tablet Take 1 tablet (100 mg total) by mouth as needed for erectile dysfunction. 2/2/16 1/30/17  Pankaj Horowitz MD   tadalafil (CIALIS) 20 MG tablet TAKE ONE TABLET BY MOUTH DAILY AS NEEDED 3/11/16 1/30/17  Pankaj Horowitz MD       Allergies:  Allergies   Allergen Reactions     Amlodipine      ankle swelling       Lisinopril-Hydrochlorothiazide      decreased libido         Immunizations:   Immunization History   Administered Date(s) Administered     DT (pediatric) 07/01/2004     Influenza, inj, historic 01/12/2012     Influenza, seasonal,quad inj 6-35 mos 11/28/2008, 11/13/2014     Td, historic 07/01/2004     Tdap 02/18/2011       Health Maintenance: Immunizations reviewed up-to-date.  Colonoscopy dated February 11, 2015 showed diverticulosis and one hyperplastic polyp per [place] [facility]ologist Dr. Arian Villareal.    Past Medical History: Hypertension.    Hyperlipidemia.    Erectile dysfunction.    Obesity BMI 37+.    Past Surgical History: Right shoulder surgery.  Played  for the [employer].    Family History: Mother living 85 poor health after multiple joint replacements.    Father living 85.    3 children well wife well.    Social History: Played college baseball Work [employer].    Exam Chest clear to auscultation and percussion.  Heart tones regular rhythm without murmur rub or gallop.  Abdomen soft nontender no organomegaly.  No peritoneal signs.  Extremities free of edema cyanosis or clubbing.  Neck veins nondistended no thyromegaly or scleral icterus noted, carotids full.  Skin warm and dry easily conversant good spirited.  Normal intelligence.  Neurologically intact no gross localizing findings.  Rest of exam negative Gen. negative rectal negative prostate negative good pulses noted all 4 extremities obesity noted discussed.  Skin negative lymph negative neuro negative psych normal HEENT negative.    Assessment and Plan  Gen. medical examination [facility] facility with nocturia every 2 hours check hemogram, principal metabolic profile A1c lipid panel PSA TSH urinalysis.    Hypertension and hyperlipidemia and erectile dysfunction.  See med list well tolerated.    Hyperplastic polyp with diverticulosis see colonoscopy report from the 11th 2015 Dr. Arian Villareal [facility] presiding.    The following high BMI interventions were performed this visit: encouragement to exercise    Rafael Perry MD    Patient Active Problem List   Diagnosis     Lumbar Strain     Benign Pigmented Nevus     Hemangioma Of The Skin     Actinic Keratosis     Seborrheic Keratosis     Essential Hypertriglyceridemia     Hyperlipidemia     Obesity     Benign Essential Hypertension     Essential Hypertension     Prostatitis     Limb Pain

## 2021-06-09 NOTE — TELEPHONE ENCOUNTER
"Coronavirus (COVID-19) Notification    Caller Name (Patient, parent, daughter/sone, grandparent, etc)  Marquis Martínez    Reason for call  Notify of Positive Coronavirus (COVID-19) lab results, assess symptoms,  review  [facility] recommendations    Lab Result    Lab test:  2019-nCoV rRt-PCR or SARS-CoV-2 PCR    Oropharyngeal AND/OR nasopharyngeal swabs is POSITIVE for 2019-nCoV RNA/SARS-COV-2 PCR (COVID-19 virus)    RN Recommendations/Instructions per [facility] Coronavirus COVID-19 recommendations    Brief introduction script  Introduce self and then review script:  \"I am calling on behalf of [employer].  We were notified that your Coronavirus test (COVID-19) for was POSITIVE for the virus.  I have some information to relay to you but first I wanted to mention that the [place] Dept of Health will be contacting you shortly [it's possible MD already called Patient] to talk to you more about how you are feeling and other people you have had contact with who might now also have the virus.  Also, [facility] is Partnering with the [facility] for Covid-19 research, you may be contacted directly by research staff.\"    ssessment (Inquire about Patient's current symptoms)   Assessment   Current Symptoms at time of phone call: (if no symptoms, document No symptoms] fatigue   Symptoms onset (if applicable) Onset 7/19/20     If at time of call, Patients symptoms hare worsened, the Patient should contact 911 or have someone drive them to Emergency Dept promptly:      If Patient calling 911, inform 911 personal that you have tested positive for the Coronavirus (COVID-19).  Place mask on and await 911 to arrive.    If Emergency Dept, If possible, please have another adult drive you to the Emergency Dept but you need to wear mask when in contact with other people.      Review information with Patient    Your result was positive. This means you have COVID-19 (coronavirus).  We have sent you a letter that " reviews the information that I'll be reviewing with you now.    How can I protect others?    If you have symptoms: stay home and away from others (self-isolate) until:    You've had no fever--and no medicine that reduces fever--for 3 full days (72 hours). And      Your other symptoms have gotten better. For example, your cough or breathing has improved. And     At least 10 days have passed since your symptoms started.    If you don't have symptoms: Stay home and away from others (self-isolate) until at least 10 days have passed since your first positive COVID-19 test. (Date test collected).    During this time:    Stay in your own room, including for meals. Use your own bathroom if you can.    Stay away from others in your home. No hugging, kissing or shaking hands. No visitors.     Don't go to work, school or anywhere else.     Clean  high touch  surfaces often (doorknobs, counters, handles, etc.). Use a household cleaning spray or wipes. You'll find a full list on the EPA website at www.epa.gov/pesticide-registration/list-n-disinfectants-use-against-sars-cov-2.     Cover your mouth and nose with a mask, tissue or washcloth to avoid spreading germs.    Wash your hands and face often with soap and water.    Caregivers in these groups are at risk for severe illness due to COVID-19:  o People 65 years and older  o People who live in a nursing home or long-term care facility  o People with chronic disease (lung, heart, cancer, diabetes, kidney, liver, immunologic)  o People who have a weakened immune system, including those who:  - Are in cancer treatment  - Take medicine that weakens the immune system, such as corticosteroids  - Had a bone marrow or organ transplant  - Have an immune deficiency  - Have poorly controlled HIV or AIDS  - Are obese (body mass index of 40 or higher)  - Smoke regularly    Caregivers should wear gloves while washing dishes, handling laundry and cleaning bedrooms and bathrooms.    Wash and  dry laundry with special caution. Don't shake dirty laundry, and use the warmest water setting you can.    If you have a weakened immune system, ask your doctor about other actions you should take.    For more tips, go to www.cdc.gov/coronavirus/2019-ncov/downloads/10Things.pdf.    You should not go back to work until you meet the guidelines above for ending your home isolation. You should meet these along with any other guidelines that your employer has.    Employers: This document serves as formal notice of your employee's medical guidelines for going back to work. They must meet the above guidelines before going back to work in person.    How can I take care of myself?    1. Get lots of rest. Drink extra fluids (unless a doctor has told you not to).    2. Take Tylenol (acetaminophen) for fever or pain. If you have liver or kidney problems, ask your family doctor if it's okay to take Tylenol.     Take either:     650 mg (two 325 mg pills) every 4 to 6 hours, or     1,000 mg (two 500 mg pills) every 8 hours as needed.     Note: Don't take more than 3,000 mg in one day. Acetaminophen is found in many medicines (both prescribed and over-the-counter medicines). Read all labels to be sure you don't take too much.    For children, check the Tylenol bottle for the right dose (based on their age or weight).    3. If you have other health problems (like cancer, heart failure, an organ transplant or severe kidney disease): Call your specialty clinic if you don't feel better in the next 2 days.    4. Know when to call 911: Emergency warning signs include:    Trouble breathing or shortness of breath    Pain or pressure in the chest that doesn't go away    Feeling confused like you haven't felt before, or yesnot being able to wake up    Bluish-colored lips or face    5. Sign up for [employer]. We know it's scary to hear that you have COVID-19. We want to track your symptoms to make sure you're okay over the next 2 weeks.  Please look for an email from [employer]--this is a free, online program that we'll use to keep in touch. To sign up, follow the link in the email. Learn more at [email or web address].    Where can I get more information?    [facility]: [email or web address]    Coronavirus Basics: www.health.[email or web address].[place]./diseases/coronavirus/basics.html    What to Do If You're Sick: www.cdc.gov/coronavirus/2019-ncov/about/steps-when-sick.html    Ending Home Isolation: www.cdc.gov/coronavirus/2019-ncov/hcp/disposition-in-home-patients.html     Caring for Someone with COVID-19: www.cdc.gov/coronavirus/2019-ncov/if-you-are-sick/care-for-someone.html     [facility] clinical trials (COVID-19 research studies): clinicalaffairs.[facility]-clinical-trials     Positive COVID-19 letter sent via [employer] or Mail (Yes/No):yes    Pt verbalizes understanding of the above information     [Name]  Brittanie Padilla RN

## 2021-06-09 NOTE — TELEPHONE ENCOUNTER
Upcoming Appointment Question  When is the appointment: 07/01/20  What is your appointment for?: Physicals   Who is your appointment scheduled with?: PCP only  What is your question/concern?: Patient states he was Originally scheduled  in June for physicals clinic called him  to Re-schedule to 07/01/20  patient is questioning is there any changes on his appointment on 07/01/20? Please call patient with information .  Okay to leave a detailed message?: No

## 2021-06-09 NOTE — TELEPHONE ENCOUNTER
Patient notified that as of now we will leave appointment for 7/30 as is and will let him know if there are any changes

## 2021-06-10 NOTE — PROGRESS NOTES
"Marquis Martínez is a 55 y.o. male who is being evaluated via a billable telephone visit.      The patient has been notified of following:     \"This telephone visit will be conducted via a call between you and your physician/provider. We have found that certain health care needs can be provided without the need for a physical exam.  This service lets us provide the care you need with a short phone conversation.  If a prescription is necessary we can send it directly to your pharmacy.  If lab work is needed we can place an order for that and you can then stop by our lab to have the test done at a later time.    Telephone visits are billed at different rates depending on your insurance coverage. During this emergency period, for some insurers they may be billed the same as an in-person visit.  Please reach out to your insurance provider with any questions.    If during the course of the call the physician/provider feels a telephone visit is not appropriate, you will not be charged for this service.\"    Patient has given verbal consent to a Telephone visit? Yes    What phone number would you like to be contacted at? [phone]    Patient would like to receive their AVS by AVS Preference: Jeb.    Additional provider notes: COVID-19 infection with loss of taste loss of smell positive PCR test and or serology test.  Trouble with breathing at night wheezing and congestion.  Wife had similar illness COVID-19 and improve her breathing with short course of prednisone 30 mg daily for 5 days.  Similar treatment was given to their daughter who also tested positive for COVID-19.    No blood in stool or urine or sputum medication list reviewed well-tolerated normal effects well-tolerated normal effects.  No known drug allergies except for amlodipine lisinopril HCTZ.    Assessment/Plan:  COVID-19 with bronchospastic changes and congestion.  Prednisone 30 mg daily for 5 days.  Recovering from COVID-19 discussed and " reassured.    Strong positive family history for same recently.  Wife and daughter.      Phone call duration: 11 minutes    Hemalatha John, [facility] [address]

## 2021-06-11 NOTE — PROGRESS NOTES
Office Visit - Physical    Marquis KILLIAN Martínez   55 y.o. male    Date of Visit: 2020    Chief Complaint   Patient presents with     Annual Exam     Physical Exam    fasting       Subjective: 55-year-old male here for physical examination.  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    Pain left side of abdomen upon return home from [facility] yesterday for a .    Nocturia.    Pleuritic-like discomfort when he takes a deep breath and chest congestion.  Chest x-ray pending.    1 to 3 months ago had COVID-19.    Father living has had a history of prostate cancer.  Right wrist weakness and aches.  Arthritis versus tendinitis.  Consider CTS doubt.    Non-smoker.    Light alcohol intake not heavy.    Colonoscopy done 2015 showed a hyperplastic colon polyp with Dr. Allen at [facility].    Allergies include amlodipine lisinopril.    ROS: A comprehensive review of systems was performed and was otherwise negative    Medications:   Prior to Admission medications    Medication Sig Start Date End Date Taking? Authorizing Provider   losartan-hydrochlorothiazide (HYZAAR) 100-25 mg per tablet Take 1 tablet by mouth daily. 3/26/20   Rafael Perry MD   sildenafiL (VIAGRA) 100 MG tablet TAKE 1/4 TO 1/2 TABLET BY MOUTH DAILY AS NEEDED FOR ERECTILE DYSFUNCTION. 20   Rafael Perry MD   sildenafil, antihypertensive, (REVATIO) 20 mg tablet TAKE 1 TO 2 TABLETS BY MOUTH AS DIRECTED 18   Rony Fu MD   simvastatin (ZOCOR) 20 MG tablet TAKE 1 TABLET BY MOUTH AT BEDTIME 3/24/20   Rafael Perry MD       Allergies:  Allergies   Allergen Reactions     Amlodipine      ankle swelling       Lisinopril-Hydrochlorothiazide      decreased libido         Immunizations:   Immunization History   Administered Date(s) Administered     DT (pediatric) 2004     Influenza, inj, historic,unspecified 2012     Influenza, seasonal,quad inj 6-35 mos  11/28/2008, 11/13/2014     Td,adult,historic,unspecified 07/01/2004     Tdap 02/18/2011       Health Maintenance: Immunizations reviewed and up-to-date.    Past Medical History: Hypertension and hyperlipidemia and history of erectile dysfunction better with Viagra.  No target organ damage related to hypertension or hyperlipidemia.    Obesity.  Weight is up 19 pounds from previous BMI 37.    Past Surgical History: Prior shoulder surgery.    Family History: Both parents living in their late 80s.    3 children well 2-3 children had COVID-19.    Wife well recently recovered from COVID.    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  Current bowel specialist.  Left-sided abdominal pain try Metamucil if no better in 3 to 4 days suggest CT scan abdomen and pelvis.    Exam Chest clear to auscultation and percussion.  Heart tones regular rhythm without murmur rub or gallop.  Abdomen soft nontender no organomegaly.  No peritoneal signs.  Extremities free of edema cyanosis or clubbing.  Neck veins nondistended no thyromegaly or scleral icterus noted, carotids full.  Skin warm and dry easily conversant good spirited.  Normal intelligence.  Neurologically intact no gross localizing findings.    Rest of exam negative in its entirety testicular exam scrotal contents negative rectal negative small prostate good pulse noted in all 4 extremities skin negative lymph negative neuro negative psych normal HEENT negative back straight no severe spine tenderness noted.    124/80 pulse 76 respirations 18 O2 sats 96% temperature this morning 98.0  F.  Weight up 19 pounds since last visit BMI 37.  Over 6 foot 3 inches tall 6 foot 3-1/4 inches tall easily conversant good spirited athletic in build strong.  Neuromuscular tone excellent.  Well tanned.  Short cropped hair.  Cells automotive trucks.    Assessment and Plan  General medical examination at healthcare facility after COVID-19 check chest x-ray  plus hemogram comprehensive metabolic profile urinalysis lipid panel PSA TSH.  Offered CT scan of abdomen and pelvis and patient will try Metamucil in the meantime 1 heaping tablespoon daily if no better in 3 to 4 days CT scan of abdomen pelvis will be done for left-sided abdominal pain.  Not evidence for acute abdominal process at this juncture.    The following high BMI interventions were performed this visit: encouragement to exercise    Rafael Perry MD    Patient Active Problem List   Diagnosis     Lumbar Strain     Benign Pigmented Nevus     Hemangioma Of The Skin     Actinic Keratosis     Seborrheic Keratosis     Essential Hypertriglyceridemia     Hyperlipidemia     Obesity     Benign Essential Hypertension     Essential Hypertension     Prostatitis     Limb Pain

## 2021-06-11 NOTE — TELEPHONE ENCOUNTER
RN cannot approve Refill Request    RN can NOT refill this medication medication not on med list. Last office visit: 1/30/2017 Rafael Perry MD Last Physical: 3/19/2019 Last MTM visit: Visit date not found Last visit same specialty: 1/30/2017 Rafael Perry MD.  Next visit within 3 mo: Visit date not found  Next physical within 3 mo: Visit date not found      Monserrat Cyr, [facility] Triage/Med Refill 9/9/2020    Requested Prescriptions   Pending Prescriptions Disp Refills     sildenafiL (VIAGRA) 100 MG tablet [Pharmacy Med Name: SILDENAFIL CITRATE 100 MG T 100 TAB] 15 tablet 3     Sig: TAKE 1/4 TO 1/2 TABLET BY MOUTH DAILY AS NEEDED FOR ERECTILE DYSFUNCTION.       Medications for Impotence Refill Protocol Passed - 9/8/2020 12:33 PM        Passed - PCP or prescribing provider visit in last year     Last office visit with prescriber/PCP: 1/30/2017 Rafael Perry MD OR same dept: Visit date not found OR same specialty: 1/30/2017 Rafael Perry MD  Last physical: 3/19/2019 Last MTM visit: Visit date not found   Next visit within 3 mo: Visit date not found  Next physical within 3 mo: Visit date not found  Prescriber OR PCP: Rafael Perry MD  Last diagnosis associated with med order: 1. ED (erectile dysfunction)  - sildenafiL (VIAGRA) 100 MG tablet [Pharmacy Med Name: SILDENAFIL CITRATE 100 MG T 100 TAB]; TAKE 1/4 TO 1/2 TABLET BY MOUTH DAILY AS NEEDED FOR ERECTILE DYSFUNCTION  Dispense: 15 tablet; Refill: 3    If protocol passes may refill for 12 months if within 3 months of last provider visit (or a total of 15 months).

## 2021-06-14 NOTE — TELEPHONE ENCOUNTER
sildenafiL (VIAGRA) 100 MG tablet [[id number]]    Electronically signed by: Rafael Perry MD on 09/09/20 1234 Status: Discontinued   Ordering user: Rafael Perry MD 09/09/20 1234 Authorized by: Rafael Perry MD   Frequency:  09/09/20 - 09/14/20  Released by: Rafael Perry MD 09/09/20 1234   Discontinued by: Hemalatha John [facility] 09/14/20 0740 [Therapy completed]   Diagnoses   ED (erectile dysfunction) [N52.9

## 2021-06-15 NOTE — TELEPHONE ENCOUNTER
RN cannot approve Refill Request    RN can NOT refill this medication Dose clarification needed. Last office visit: 1/30/2017 Rafael Perry MD Last Physical: 9/14/2020 Last MTM visit: Visit date not found Last visit same specialty: 1/30/2017 Rafael Perry MD.  Next visit within 3 mo: Visit date not found  Next physical within 3 mo: Visit date not found      Yovana Gonzalez, [facility] Triage/Med Refill 2/19/2021    Requested Prescriptions   Pending Prescriptions Disp Refills     sildenafiL (VIAGRA) 100 MG tablet [Pharmacy Med Name: SILDENAFIL CITRATE 100 MG T 100 Tablet] 15 tablet 3     Sig: TAKE 1/4 TO 1/2 TABLET BY MOUTH DAILY AS NEEDED FOR ERECTILE DYSFUNCTION.       Medications for Impotence Refill Protocol Passed - 2/19/2021 11:47 AM        Passed - PCP or prescribing provider visit in last year     Last office visit with prescriber/PCP: 1/30/2017 Rafael Perry MD OR same dept: Visit date not found OR same specialty: 1/30/2017 Rafael Perry MD  Last physical: 9/14/2020 Last MTM visit: Visit date not found   Next visit within 3 mo: Visit date not found  Next physical within 3 mo: Visit date not found  Prescriber OR PCP: Rafael Perry MD  Last diagnosis associated with med order: 1. ED (erectile dysfunction)  - sildenafiL (VIAGRA) 100 MG tablet [Pharmacy Med Name: SILDENAFIL CITRATE 100 MG T 100 Tablet]; TAKE 1/4 TO 1/2 TABLET BY MOUTH DAILY AS NEEDED FOR ERECTILE DYSFUNCTION.  Dispense: 15 tablet; Refill: 3    If protocol passes may refill for 12 months if within 3 months of last provider visit (or a total of 15 months).

## 2021-06-16 NOTE — PROGRESS NOTES
Office Visit - Physical    Marquis Martínez   53 y.o. male    Date of Visit: 3/19/2018    Chief Complaint   Patient presents with     Annual Exam     Physical Exam   fasting     Nocturia       Subjective: Physical.    53-year-old male here for physical exam.  Nocturia.    Problem list reviewed.    Allergies amlodipine and lisinopril HCTZ.  Non-smoker.    Alcohol intake 6 beers per week.    Colonoscopy dated February 13, 2015 with Dr. Allen of [facility] showed diverticulosis plus hyperplastic colon polyp otherwise negative.    ROS: A comprehensive review of systems was performed and was otherwise negative    Medications:   Prior to Admission medications    Medication Sig Start Date End Date Taking? Authorizing Provider   losartan-hydrochlorothiazide (HYZAAR) 100-25 mg per tablet TAKE ONE TABLET BY MOUTH DAILY 3/14/17  Yes Pankaj Horowitz MD   simvastatin (ZOCOR) 20 MG tablet TAKE 1 TABLET BY MOUTH EVERY NIGHT AT BEDTIME 2/11/18  Yes Pankaj Horowitz MD   tadalafil (CIALIS) 5 MG tablet TAKE ONE TABLET AS DIRECTED 3/14/17  Yes Pankaj Horowitz MD   sildenafil, antihypertensive, (REVATIO) 20 mg tablet TAKE 1 TO 2 TABLETS BY MOUTH AS DIRECTED 1/17/18   Rafael Perry MD       Allergies:  Allergies   Allergen Reactions     Amlodipine      ankle swelling       Lisinopril-Hydrochlorothiazide      decreased libido         Immunizations:   Immunization History   Administered Date(s) Administered     DT (pediatric) 07/01/2004     Influenza, inj, historic,unspecified 01/12/2012     Influenza, seasonal,quad inj 6-35 mos 11/28/2008, 11/13/2014     Td,adult,historic,unspecified 07/01/2004     Tdap 02/18/2011       Health Maintenance: Immunizations reviewed and up-to-date.    Past Medical History: Hypertension and hyperlipidemia and erectile dysfunction.    Obesity BMI is elevated at 35+.  Previously 37+.    Past Surgical History: Shoulder surgery.    Family History: Mother and father both living in their mid to late  80s.    3 children well.  Wife well.  Mother-in-law going to treatment for lung cancer.    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    Exam Chest clear to auscultation and percussion.  Heart tones regular rhythm without murmur rub or gallop.  Abdomen soft nontender no organomegaly.  No peritoneal signs.  Extremities free of edema cyanosis or clubbing.  Neck veins nondistended no thyromegaly or scleral icterus noted, carotids full.  Skin warm and dry easily conversant good spirited.  Normal intelligence.  Neurologically intact no gross localizing findings.  Rest of exam negative in its entirety including negative skin lymph neuropsych HEENT back straight no severe spine tenderness general rectal exam negative good pulses noted in all 4 extremities no carotid bruits HEENT negative.  Short cropped crewcut hair easily conversant excellent neuromuscular tone BMI elevated.    Assessment and Plan  General medical examination at healthcare facility with nocturia and 53-year-old male obese.  Consider polyuria polydipsia from diabetes mellitus type 2.  Check A1c plus hemogram comprehensive metabolic profile urinalysis lipid panel PSA and TSH.    Hypertension and hyperlipidemia and erectile dysfunction.  Sialoliths on board 20 mg daily as needed.  History of diverticulosis and hyperplastic colon polyp see colonoscopy report February 13, 2015 no cancer.  Dr. MARBELLA Allen [facility] presiding.  May need [facility] consultation if nocturia persists after abstaining from liquids after 7 PM each evening.    The following high BMI interventions were performed this visit: encouragement to exercise    Rafael Perry MD    Patient Active Problem List   Diagnosis     Lumbar Strain     Benign Pigmented Nevus     Hemangioma Of The Skin     Actinic Keratosis     Seborrheic Keratosis     Essential Hypertriglyceridemia     Hyperlipidemia     Obesity     Benign Essential Hypertension      Essential Hypertension     Prostatitis     Limb Pain

## 2021-06-17 NOTE — TELEPHONE ENCOUNTER
Refill Approved    Rx renewed per Medication Renewal Policy. Medication was last renewed on 03/24/2020.  Last office visit was 09/14/2020 with PCP.    Barbara Hernandez, [facility] Triage/Med Refill 5/24/2021     Requested Prescriptions   Pending Prescriptions Disp Refills     simvastatin (ZOCOR) 20 MG tablet [Pharmacy Med Name: SIMVASTATIN 20MG TABLETS] 90 tablet 3     Sig: TAKE 1 TABLET BY MOUTH AT BEDTIME       Statins Refill Protocol (Hmg CoA Reductase Inhibitors) Passed - 5/24/2021  3:45 AM        Passed - PCP or prescribing provider visit in past 12 months      Last office visit with prescriber/PCP: Visit date not found OR same dept: Visit date not found OR same specialty: Visit date not found  Last physical: 9/14/2020 Last MTM visit: Visit date not found   Next visit within 3 mo: Visit date not found  Next physical within 3 mo: Visit date not found  Prescriber OR PCP: Rafael Perry MD  Last diagnosis associated with med order: 1. HLD (hyperlipidemia)  - simvastatin (ZOCOR) 20 MG tablet [Pharmacy Med Name: SIMVASTATIN 20MG TABLETS]; TAKE 1 TABLET BY MOUTH AT BEDTIME  Dispense: 90 tablet; Refill: 3    If protocol passes may refill for 12 months if within 3 months of last provider visit (or a total of 15 months).

## 2021-06-17 NOTE — TELEPHONE ENCOUNTER
Refill Approved    Rx renewed per Medication Renewal Policy. Medication was last renewed on 9/2020.    Iris Pruett, [facility] Triage/Med Refill 5/4/2021     Requested Prescriptions   Signed Prescriptions Disp Refills    losartan-hydrochlorothiazide (HYZAAR) 100-25 mg per tablet 90 tablet 1     Sig: Take 1 tablet by mouth daily.       There is no refill protocol information for this order

## 2021-06-19 NOTE — LETTER
Letter by Rafael Perry MD at      Author: Rafael Perry MD Service: -- Author Type: --    Filed:  Encounter Date: 3/19/2019 Status: (Other)         Marquis Martínez  [address] [place] [address]             March 19, 2019         Dear Mr. Martínez,    Below are the results from your recent visit:    Resulted Orders   HM2(CBC w/o Differential)   Result Value Ref Range    WBC 6.2 4.0 - 11.0 thou/uL    RBC 4.84 4.40 - 6.20 mill/uL    Hemoglobin 15.4 14.0 - 18.0 g/dL    Hematocrit 44.1 40.0 - 54.0 %    MCV 91 80 - 100 fL    MCH 31.9 27.0 - 34.0 pg    MCHC 35.0 32.0 - 36.0 g/dL    RDW 11.7 11.0 - 14.5 %    Platelets 260 140 - 440 thou/uL    MPV 7.7 7.0 - 10.0 fL   Comprehensive Metabolic Panel   Result Value Ref Range    Sodium 139 136 - 145 mmol/L    Potassium 4.0 3.5 - 5.0 mmol/L    Chloride 103 98 - 107 mmol/L    CO2 25 22 - 31 mmol/L    Anion Gap, Calculation 11 5 - 18 mmol/L    Glucose 107 70 - 125 mg/dL    BUN 17 8 - 22 mg/dL    Creatinine 1.07 0.70 - 1.30 mg/dL    GFR MDRD Af Amer >60 >60 mL/min/1.73m2    GFR MDRD Non Af Amer >60 >60 mL/min/1.73m2    Bilirubin, Total 0.7 0.0 - 1.0 mg/dL    Calcium 9.6 8.5 - 10.5 mg/dL    Protein, Total 7.3 6.0 - 8.0 g/dL    Albumin 4.3 3.5 - 5.0 g/dL    Alkaline Phosphatase 61 45 - 120 U/L    AST 18 0 - 40 U/L    ALT 16 0 - 45 U/L    Narrative    Fasting Glucose reference range is 70-99 mg/dL per  American Diabetes Association (ADA) guidelines.   Lipid Cascade   Result Value Ref Range    Cholesterol 199 <=199 mg/dL    Triglycerides 259 (H) <=149 mg/dL    HDL Cholesterol 42 >=40 mg/dL    LDL Calculated 105 <=129 mg/dL    Patient Fasting > 8hrs? Yes    Thyroid Stimulating Hormone (TSH)   Result Value Ref Range    TSH 1.78 0.30 - 5.00 uIU/mL   Urinalysis-UC if Indicated   Result Value Ref Range    Color, UA Yellow Colorless, Yellow, Straw, Light Yellow    Clarity, UA Clear Clear    Glucose, UA Negative Negative    Bilirubin, UA Negative Negative    Ketones, UA  Negative Negative    Specific Gravity, UA 1.020 1.005 - 1.030    Blood, UA Negative Negative    pH, UA 7.5 5.0 - 8.0    Protein, UA Negative Negative mg/dL    Urobilinogen, UA 0.2 E.U./dL 0.2 E.U./dL, 1.0 E.U./dL    Nitrite, UA Negative Negative    Leukocytes, UA Negative Negative    Narrative    Microscopic not indicated  UC not indicated   PSA (Prostatic-Specific Antigen), Annual Screen   Result Value Ref Range    PSA 2.1 0.0 - 3.5 ng/mL    Narrative    Method is Abbott Prostate-Specific Antigen (PSA)  Standard-WHO 1st International (90:10)       All very good results    Please call with questions or contact us using [facility] [employer] [employer].    Sincerely,        Electronically signed by Rafael Perry MD

## 2021-06-20 NOTE — LETTER
Letter by Rafael Perry MD at      Author: Rafael Perry MD Service: -- Author Type: --    Filed:  Encounter Date: 9/15/2020 Status: (Other)         Marquis Martínez  [address] [place] [address]             September 15, 2020         Dear Mr. Martínez,    Below are the results from your recent visit:    Resulted Orders   HM2(CBC w/o Differential)   Result Value Ref Range    WBC 8.2 4.0 - 11.0 thou/uL    RBC 4.83 4.40 - 6.20 mill/uL    Hemoglobin 15.3 14.0 - 18.0 g/dL    Hematocrit 45.3 40.0 - 54.0 %    MCV 94 80 - 100 fL    MCH 31.6 27.0 - 34.0 pg    MCHC 33.7 32.0 - 36.0 g/dL    RDW 12.0 11.0 - 14.5 %    Platelets 223 140 - 440 thou/uL    MPV 7.8 7.0 - 10.0 fL   Comprehensive Metabolic Panel   Result Value Ref Range    Sodium 139 136 - 145 mmol/L    Potassium 4.3 3.5 - 5.0 mmol/L    Chloride 105 98 - 107 mmol/L    CO2 23 22 - 31 mmol/L    Anion Gap, Calculation 11 5 - 18 mmol/L    Glucose 102 70 - 125 mg/dL    BUN 15 8 - 22 mg/dL    Creatinine 1.00 0.70 - 1.30 mg/dL    GFR MDRD Af Amer >60 >60 mL/min/1.73m2    GFR MDRD Non Af Amer >60 >60 mL/min/1.73m2    Bilirubin, Total 0.7 0.0 - 1.0 mg/dL    Calcium 9.6 8.5 - 10.5 mg/dL    Protein, Total 6.9 6.0 - 8.0 g/dL    Albumin 4.1 3.5 - 5.0 g/dL    Alkaline Phosphatase 68 45 - 120 U/L    AST 17 0 - 40 U/L    ALT 22 0 - 45 U/L    Narrative    Fasting Glucose reference range is 70-99 mg/dL per  American Diabetes Association (ADA) guidelines.   Lipid Cascade   Result Value Ref Range    Cholesterol 181 <=199 mg/dL    Triglycerides 350 (H) <=149 mg/dL    HDL Cholesterol 40 >=40 mg/dL    LDL Calculated 71 <=129 mg/dL    Patient Fasting > 8hrs? Unknown    Thyroid Stimulating Hormone (TSH)   Result Value Ref Range    TSH 2.50 0.30 - 5.00 uIU/mL   Urinalysis-UC if Indicated   Result Value Ref Range    Color, UA Yellow Colorless, Yellow, Straw, Light Yellow    Clarity, UA Clear Clear    Glucose, UA Negative Negative    Bilirubin, UA Negative Negative     Ketones, UA Negative Negative    Specific Gravity, UA 1.020 1.005 - 1.030    Blood, UA Negative Negative    pH, UA 5.5 5.0 - 8.0    Protein, UA Negative Negative mg/dL    Urobilinogen, UA 0.2 E.U./dL 0.2 E.U./dL, 1.0 E.U./dL    Nitrite, UA Negative Negative    Leukocytes, UA Negative Negative    Narrative    Microscopic not indicated  UC not indicated   PSA (Prostatic-Specific Antigen), Annual Screen   Result Value Ref Range    PSA 2.7 0.0 - 3.5 ng/mL    Narrative    Method is Abbott Prostate-Specific Antigen (PSA)  Standard-WHO 1st International (90:10)       All very good results but triglycerides are a bit too high and weight loss with regular exercise will help it     Please call with questions or contact us using [employer].    Sincerely,        Electronically signed by Rafael Perry MD

## 2021-06-20 NOTE — LETTER
Letter by Brittanie Padilla RN at      Author: Brittanie Padilla RN Service: -- Author Type: --    Filed:  Encounter Date: 7/24/2020 Status: (Other)       7/24/2020        Marquis Martínez  [address] [place] [address]    This letter provides a written record that you were tested for COVID-19 on 7/22/20    Your result was positive. This means you have COVID-19 (coronavirus).    How can I protect others?If you have symptoms, stay home and away from others (self-isolate) until:Youve had no fever--and no medicine that reduces fever--for 3 full days (72 hours). And?     Your other symptoms have gotten better. For example, your cough or breathing has improved. And?  At least 10 days have passed since your symptoms started.    If you dont have symptoms: Stay home and away from others (self-isolate) until at least 10 days have passed since your first positive COVID-19 test.    During this time:    Stay in your own room, including for meals. Use your own bathroom if you can.    Stay away from others in your home. No hugging, kissing or shaking hands. No visitors.     Dont go to work, school or anywhere else.     Clean high touch surfaces often (doorknobs, counters, handles, etc.). Use a household cleaning spray or wipes. Youll find a full list on the EPA website at www.epa.gov/pesticide-registration/list-n-disinfectants-use-against-sars-cov-2.     Cover your mouth and nose with a mask, tissue or washcloth to avoid spreading germs.    Wash your hands and face often with soap and water.    Caregivers in these groups are at risk for severe illness due to COVID-19:  o People 65 years and older  o People who live in a nursing home or long-term care facility  o People with chronic disease (lung, heart, cancer, diabetes, kidney, liver, immunologic)  o People who have a weakened immune system, including those who:    Are in cancer treatment    Take medicine that weakens the immune system, such as corticosteroids    Had a  bone marrow or organ transplant    Have an immune deficiency    Have poorly controlled HIV or AIDS    Are obese (body mass index of 40 or higher)    Smoke regularly    Caregivers should wear gloves while washing dishes, handling laundry and cleaning bedrooms and bathrooms.    Wash and dry laundry with special caution. Dont shake dirty laundry, and use the warmest water setting you can.    If you have a weakened immune system, ask your doctor about other actions you should take.    For more tips, go to www.cdc.gov/coronavirus/2019-ncov/downloads/10Things.pdf.    You should not go back to work until you meet the guidelines above for ending your home isolation. You should meet these along with any other guidelines that your employer has.    Employers: This document serves as formal notice of your employees medical guidelines for going back to work. They must meet the above guidelines before going back to work in person.    How can I take care of myself?    1. Get lots of rest. Drink extra fluids (unless a doctor has told you not to).    2. Take Tylenol (acetaminophen) for fever or pain. If you have liver or kidney problems, ask your family doctor if its okay to take Tylenol.     Take either:     650 mg (two 325 mg pills) every 4 to 6 hours, or?    1,000 mg (two 500 mg pills) every 8 hours as needed.     Note: Dont take more than 3,000 mg in one day. Acetaminophen is found in many medicines (both prescribed and over-the-counter medicines). Read all labels to be sure you dont take too much.    For children, check the Tylenol bottle for the right dose (based on their age or weight).    3. If you have other health problems (like cancer, heart failure, an organ transplant or severe kidney disease): Call your specialty clinic if you dont feel better in the next 2 days.    4. Know when to call 911: Emergency warning signs include:    Trouble breathing or shortness of breath    Pain or pressure in the chest that doesnt go  away    Feeling confused like you havent felt before, or not being able to wake up    Bluish-colored lips or face    5. Sign up for [employer]. We know its scary to hear that you have COVID-19. We want to track your symptoms to make sure youre okay over the next 2 weeks. Please look for an email from [employer]--this is a free, online program that well use to keep in touch. To sign up, follow the link in the email. Learn more at [email or web address].    Where can I get more information?    [facility]: [email or web address]    Coronavirus Basics: www.health.[facility].[place]/diseases/coronavirus/basics.html    What to Do If Youre Sick: www.cdc.gov/coronavirus/2019-ncov/about/steps-when-sick.html    Ending Home Isolation: www.cdc.gov/coronavirus/2019-ncov/hcp/disposition-in-home-patients.html     Caring for Someone with COVID-19: www.cdc.gov/coronavirus/2019-ncov/if-you-are-sick/care-for-someone.html     [facility] clinical trials (COVID-19 research studies): clinicalaffairs.[facility]-clinical-trials

## 2021-06-25 NOTE — PROGRESS NOTES
Office Visit - Physical    Marquis Martínez   54 y.o. male    Date of Visit: 3/19/2019    Chief Complaint   Patient presents with     Annual Exam     Physical Exam   fasting       Subjective: Physical examination.    54-year-old male here for physical examination.  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  One week ago he had a spell of diarrhea better now.    Colonoscopy showed hyperplastic polyp and diverticulosis date of colonoscopy February 11, 2015 with Dr. MARBELLA Villareal of [facility].    Allergies include amlodipine and lisinopril HCTZ.    Non-smoker.    Alcohol intake is light social no more than 6 beers per week.    ROS: A comprehensive review of systems was performed and was otherwise negative    Medications:   Prior to Admission medications    Medication Sig Start Date End Date Taking? Authorizing Provider   losartan-hydrochlorothiazide (HYZAAR) 100-25 mg per tablet TAKE ONE TABLET BY MOUTH DAILY 5/12/18  Yes Rafael Perry MD   sildenafil, antihypertensive, (REVATIO) 20 mg tablet TAKE 1 TO 2 TABLETS BY MOUTH AS DIRECTED 4/11/18  Yes Rony Fu MD   simvastatin (ZOCOR) 20 MG tablet TAKE 1 TABLET(20 MG) BY MOUTH AT BEDTIME 2/23/19  Yes Rafael Perry MD   sildenafil (VIAGRA) 100 MG tablet TAKE 1/4 TO 1/2 TABLET BY MOUTH DAILY AS NEEDED FOR ERECTILE DYSFUNCTION 12/31/18 3/19/19  Rafael Perry MD   tadalafil (CIALIS) 5 MG tablet TAKE ONE TABLET AS DIRECTED 3/14/17 3/19/19  Pankaj Horowitz MD       Allergies:  Allergies   Allergen Reactions     Amlodipine      ankle swelling       Lisinopril-Hydrochlorothiazide      decreased libido         Immunizations:   Immunization History   Administered Date(s) Administered     DT (pediatric) 07/01/2004     Influenza, inj, historic,unspecified 01/12/2012     Influenza, seasonal,quad inj 6-35 mos 11/28/2008, 11/13/2014     Td,adult,historic,unspecified 07/01/2004     Tdap 02/18/2011       Health Maintenance:  Immunizations reviewed and up-to-date.    Past Medical History: Hypertension and hyperlipidemia and history of erectile dysfunction better with medications of Viagra sildenafil.    Obesity noted with BMI elevated at 34 previously 37.  Congratulated patient he is down 8 pounds    Past Surgical History: Shoulder surgery.    Family History: Mother and father both living in their late 80s.    3 children well wife well.  Mother-in-law  of lung cancer former patient of this examiner.    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    Exam Chest clear to auscultation and percussion.  Heart tones regular rhythm without murmur rub or gallop.  Abdomen soft nontender no organomegaly.  No peritoneal signs.  Extremities free of edema cyanosis or clubbing.  Neck veins nondistended no thyromegaly or scleral icterus noted, carotids full.  Skin warm and dry easily conversant good spirited.  Normal intelligence.  Neurologically intact no gross localizing findings.  Skin negative lymph negative neuro negative psych normal HEENT negative back straight no severe spine tenderness general rectal exam negative good pulse noted in all 4 extremities.  Small prostate without nodularity nothing to suggest malignancy.  The patient is obese with BMI of 34.    122/82 pulse 70 respirations 18 O2 sats 97% room air.  Weight down 8 pounds from previous.  Congratulated patient.  Less pasta and bread.    Assessment and Plan  General medical examination at healthcare facility in 54-year-old former  [employer] [facility].  Check hemogram comprehensive metabolic profile urinalysis lipid panel PSA TSH.    The following high BMI interventions were performed this visit: encouragement to exercise    Rafael Perry MD    Patient Active Problem List   Diagnosis     Lumbar Strain     Benign Pigmented Nevus     Hemangioma Of The Skin     Actinic  Keratosis     Seborrheic Keratosis     Essential Hypertriglyceridemia     Hyperlipidemia     Obesity     Benign Essential Hypertension     Essential Hypertension     Prostatitis     Limb Pain

## 2021-06-25 NOTE — TELEPHONE ENCOUNTER
Refill Approved    Rx renewed per Medication Renewal Policy. Medication was last renewed on 2/20/21.    Vidal Calvillo, [facility] Triage/Med Refill 6/8/2021     Requested Prescriptions   Pending Prescriptions Disp Refills     sildenafiL (VIAGRA) 100 MG tablet [Pharmacy Med Name: SILDENAFIL CITRATE 100 MG T 100 Tablet] 15 tablet 3     Sig: TAKE 1/4 TO 1/2 TABLET BY MOUTH DAILY AS NEEDED FOR ERECTILE DYSFUNCTION.       Medications for Impotence Refill Protocol Passed - 6/7/2021  2:38 PM        Passed - PCP or prescribing provider visit in last year     Last office visit with prescriber/PCP: Visit date not found OR same dept: Visit date not found OR same specialty: Visit date not found  Last physical: 9/14/2020 Last MTM visit: Visit date not found   Next visit within 3 mo: Visit date not found  Next physical within 3 mo: Visit date not found  Prescriber OR PCP: Rafael Perry MD  Last diagnosis associated with med order: 1. ED (erectile dysfunction)  - sildenafiL (VIAGRA) 100 MG tablet [Pharmacy Med Name: SILDENAFIL CITRATE 100 MG T 100 Tablet]; TAKE 1/4 TO 1/2 TABLET BY MOUTH DAILY AS NEEDED FOR ERECTILE DYSFUNCTION.  Dispense: 15 tablet; Refill: 3    If protocol passes may refill for 12 months if within 3 months of last provider visit (or a total of 15 months).

## 2021-07-03 NOTE — ADDENDUM NOTE
Addendum Note by Barbara Juares CMA at 1/9/2018  1:48 PM     Author: Barbara Juares CMA Service: -- Author Type: Certified Medical Assistant    Filed: 1/9/2018  1:48 PM Encounter Date: 1/5/2018 Status: Signed    : Barbara Juares CMA (Certified Medical Assistant)    Addended by: BARBARA JUARES on: 1/9/2018 01:48 PM        Modules accepted: Orders

## 2021-09-24 ENCOUNTER — OFFICE VISIT (OUTPATIENT)
Dept: INTERNAL MEDICINE | Facility: CLINIC | Age: 56
End: 2021-09-24
Payer: COMMERCIAL

## 2021-09-24 VITALS
HEART RATE: 62 BPM | OXYGEN SATURATION: 96 % | DIASTOLIC BLOOD PRESSURE: 80 MMHG | SYSTOLIC BLOOD PRESSURE: 120 MMHG | HEIGHT: 75 IN | WEIGHT: 267 LBS | BODY MASS INDEX: 33.2 KG/M2

## 2021-09-24 DIAGNOSIS — R97.20 RISING PSA LEVEL: ICD-10-CM

## 2021-09-24 DIAGNOSIS — Z00.00 ROUTINE GENERAL MEDICAL EXAMINATION AT A HEALTH CARE FACILITY: Primary | ICD-10-CM

## 2021-09-24 LAB
ALBUMIN SERPL-MCNC: 4.3 G/DL (ref 3.5–5)
ALBUMIN UR-MCNC: NEGATIVE MG/DL
ALP SERPL-CCNC: 66 U/L (ref 45–120)
ALT SERPL W P-5'-P-CCNC: 20 U/L (ref 0–45)
ANION GAP SERPL CALCULATED.3IONS-SCNC: 11 MMOL/L (ref 5–18)
APPEARANCE UR: CLEAR
AST SERPL W P-5'-P-CCNC: 23 U/L (ref 0–40)
BILIRUB SERPL-MCNC: 0.9 MG/DL (ref 0–1)
BILIRUB UR QL STRIP: NEGATIVE
BUN SERPL-MCNC: 17 MG/DL (ref 8–22)
CALCIUM SERPL-MCNC: 9.8 MG/DL (ref 8.5–10.5)
CHLORIDE BLD-SCNC: 103 MMOL/L (ref 98–107)
CHOLEST SERPL-MCNC: 190 MG/DL
CO2 SERPL-SCNC: 25 MMOL/L (ref 22–31)
COLOR UR AUTO: YELLOW
CREAT SERPL-MCNC: 1.03 MG/DL (ref 0.7–1.3)
ERYTHROCYTE [DISTWIDTH] IN BLOOD BY AUTOMATED COUNT: 11.8 % (ref 10–15)
FASTING STATUS PATIENT QL REPORTED: YES
GFR SERPL CREATININE-BSD FRML MDRD: 81 ML/MIN/1.73M2
GLUCOSE BLD-MCNC: 105 MG/DL (ref 70–125)
GLUCOSE UR STRIP-MCNC: NEGATIVE MG/DL
HCT VFR BLD AUTO: 41.2 % (ref 40–53)
HDLC SERPL-MCNC: 44 MG/DL
HGB BLD-MCNC: 14.4 G/DL (ref 13.3–17.7)
HGB UR QL STRIP: NEGATIVE
KETONES UR STRIP-MCNC: NEGATIVE MG/DL
LDLC SERPL CALC-MCNC: 90 MG/DL
LEUKOCYTE ESTERASE UR QL STRIP: NEGATIVE
MCH RBC QN AUTO: 31.9 PG (ref 26.5–33)
MCHC RBC AUTO-ENTMCNC: 35 G/DL (ref 31.5–36.5)
MCV RBC AUTO: 91 FL (ref 78–100)
NITRATE UR QL: NEGATIVE
PH UR STRIP: 5 [PH] (ref 5–8)
PLATELET # BLD AUTO: 271 10E3/UL (ref 150–450)
POTASSIUM BLD-SCNC: 4.2 MMOL/L (ref 3.5–5)
PROT SERPL-MCNC: 7 G/DL (ref 6–8)
PSA SERPL-MCNC: 4.3 UG/L (ref 0–3.5)
RBC # BLD AUTO: 4.51 10E6/UL (ref 4.4–5.9)
SODIUM SERPL-SCNC: 139 MMOL/L (ref 136–145)
SP GR UR STRIP: 1.02 (ref 1–1.03)
TRIGL SERPL-MCNC: 279 MG/DL
TSH SERPL DL<=0.005 MIU/L-ACNC: 2.05 UIU/ML (ref 0.3–5)
UROBILINOGEN UR STRIP-ACNC: 0.2 E.U./DL
WBC # BLD AUTO: 6.8 10E3/UL (ref 4–11)

## 2021-09-24 PROCEDURE — G0103 PSA SCREENING: HCPCS | Performed by: INTERNAL MEDICINE

## 2021-09-24 PROCEDURE — 80050 GENERAL HEALTH PANEL: CPT | Performed by: INTERNAL MEDICINE

## 2021-09-24 PROCEDURE — 90682 RIV4 VACC RECOMBINANT DNA IM: CPT | Performed by: INTERNAL MEDICINE

## 2021-09-24 PROCEDURE — 36415 COLL VENOUS BLD VENIPUNCTURE: CPT | Performed by: INTERNAL MEDICINE

## 2021-09-24 PROCEDURE — 90471 IMMUNIZATION ADMIN: CPT | Performed by: INTERNAL MEDICINE

## 2021-09-24 PROCEDURE — 81003 URINALYSIS AUTO W/O SCOPE: CPT | Performed by: INTERNAL MEDICINE

## 2021-09-24 PROCEDURE — 99396 PREV VISIT EST AGE 40-64: CPT | Mod: 25 | Performed by: INTERNAL MEDICINE

## 2021-09-24 PROCEDURE — 80061 LIPID PANEL: CPT | Performed by: INTERNAL MEDICINE

## 2021-09-24 RX ORDER — LOSARTAN POTASSIUM AND HYDROCHLOROTHIAZIDE 25; 100 MG/1; MG/1
1 TABLET ORAL DAILY
COMMUNITY
Start: 2021-06-20 | End: 2021-11-10

## 2021-09-24 ASSESSMENT — MIFFLIN-ST. JEOR: SCORE: 2126.73

## 2021-09-24 NOTE — PROGRESS NOTES
Office Visit - Physical    Marquis KILLIAN Martínez   56 year old male    Date of Visit: 9/24/2021    Chief Complaint   Patient presents with     Physical     Fasting     Medication Question     switching to Crestor       Subjective: Physical examination for this 56-year-old male.  Feels well former  for the [employer].  1 year.    Colonoscopy dated February 11, 2015 showed no cancer hyperplastic polyp and background diverticulosis.    Non-smoker no excess alcohol.  Has received the Covid vaccine x2.  Previously had Covid illness does have nocturia digital rectal examination was normal small prostate PSA pending.  Pain in the left wrist consistent with Figueredo veins tendinitis versus osteoarthritis.  Discussed ibuprofen heat topically or ice topically rest and if no better orthopedic hand specialty consultation with Dr. LT Paula or associate at [facility].  Right-hand-dominant.  This is his left wrist.  Allergies amlodipine and lisinopril.    ROS: A comprehensive review of systems was performed and was otherwise negative    Medications:   Prior to Admission medications    Medication Sig Start Date End Date Taking? Authorizing Provider   losartan-hydrochlorothiazide (HYZAAR) 100-25 MG tablet Take 1 tablet by mouth daily 6/20/21  Yes Reported, Patient   simvastatin (ZOCOR) 20 MG tablet Take 20 mg by mouth At Bedtime.   Yes Reported, Patient       Allergies:  Allergies   Allergen Reactions     Amlodipine Unknown     ankle swelling       Lisinopril-Hydrochlorothiazide Unknown     decreased libido       Tetanus Vaccines And Toxoid [Tetanus Toxoids] Unknown       Immunizations:   Immunization History   Administered Date(s) Administered     COVID-19,PF,Moderna 03/10/2021, 04/07/2021     DT (PEDS <7y) 07/01/2004     Flu, Unspecified 01/12/2012     Influenza Vaccine, 6+MO IM (QUADRIVALENT W/PRESERVATIVES) 11/28/2008, 11/13/2014     Td,adult,historic,unspecified 07/01/2004     Tdap  (Adacel,Boostrix) 02/18/2011       Health Maintenance: Immunizations reviewed and up-to-date.  Colonoscopy up-to-date see above.    Past Medical History: Hyperlipidemia wants Crestor instead of simvastatin.    History of hypertension.  Erectile dysfunction.  No target organ damage related to hypertension or hyperlipidemia.  Weight down from 300 not to 67 congratulated patient.    Past Surgical History: Prior shoulder surgery.  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    Family History: All of his family has had Covid.  Both parents living in her late 80s.  Wife well also recovered from COVID.    Social History: College  for the [employer].  Previously used Metamucil    Exam Chest clear to auscultation and percussion.  Heart tones regular rhythm without murmur rub or gallop.  Abdomen soft nontender no organomegaly.  No peritoneal signs.  Extremities free of edema cyanosis or clubbing.  Neck veins nondistended no thyromegaly or scleral icterus noted, carotids full.  Skin warm and dry easily conversant good spirited.  Normal intelligence.  Neurologically intact no gross localizing findings.  Skin negative lymph negative neuro negative psych normal HEENT negative back straight no severe spine tenderness genital rectal exam negative small prostate no nodularity rest of examination was negative in its entirety no carotid bruits heart tones normal he is easily conversant good spirited weight down about 30 pounds BMI still elevated at 33.  Vital signs all stable.    Assessment and Plan  General medical examination at health care facility today check hemogram comprehensive metabolic profile urinalysis lipid panel PSA TSH discussed left wrist.  Discussed statin medication lipids are pending wants rosuvastatin in lieu of simvastatin.  Rosuvastatin new dose would be 10 mg daily to comparably affect lipid-lowering like simvastatin 20 mg daily.    The following high BMI interventions  were performed this visit: encouragement to exercise    Rafael Perry MD, MD    Patient Active Problem List   Diagnosis   (none) - all problems resolved or deleted     Answers for HPI/ROS submitted by the patient on 9/24/2021  Frequency of exercise:: 4-5 days/week  Getting at least 3 servings of Calcium per day:: Yes  Diet:: Regular (no restrictions)  Taking medications regularly:: No  Medication side effects:: None  Bi-annual eye exam:: NO  Dental care twice a year:: Yes  Sleep apnea or symptoms of sleep apnea:: None  Additional concerns today:: Yes  Duration of exercise:: 45-60 minutes

## 2021-09-24 NOTE — PROGRESS NOTES
SUBJECTIVE:   CC: Marquis Martínez is an 56 year old male who presents for preventative health visit.       Patient has been advised of split billing requirements and indicates understanding: Yes  Healthy Habits:     Getting at least 3 servings of Calcium per day:  Yes    Bi-annual eye exam:  NO    Dental care twice a year:  Yes    Sleep apnea or symptoms of sleep apnea:  None    Diet:  Regular (no restrictions)    Frequency of exercise:  4-5 days/week    Duration of exercise:  45-60 minutes    Taking medications regularly:  No    Medication side effects:  None    PHQ-2 Total Score: 0    Additional concerns today:  Yes          {additional problems to add (Optional):757007}    Today's PHQ-2 Score:   PHQ-2 ( 1999 [employer]) 9/24/2021   Q1: Little interest or pleasure in doing things 0   Q2: Feeling down, depressed or hopeless 0   PHQ-2 Score 0   Q1: Little interest or pleasure in doing things Not at all   Q2: Feeling down, depressed or hopeless Not at all   PHQ-2 Score 0       Abuse: Current or Past(Physical, Sexual or Emotional)- No  Do you feel safe in your environment? Yes    Have you ever done Advance Care Planning? (For example, a Health Directive, POLST, or a discussion with a medical provider or your loved ones about your wishes): Yes, patient states has an Advance Care Planning document and will bring a copy to the clinic.    Social History     Tobacco Use     Smoking status: Never Smoker     Smokeless tobacco: Never Used   Substance Use Topics     Alcohol use: Yes     Alcohol/week: 12.0 standard drinks     Types: 12 Cans of beer per week         Alcohol Use 9/24/2021   Prescreen: >3 drinks/day or >7 drinks/week? Yes   AUDIT SCORE  7   {add AUDIT responses (Optional) (A score of 7 for adult men is an indication of hazardous drinking; a score of 8 or more is an indication of an alcohol use disorder.  A score of 7 or more for adult women is an indication of hazardous drinking or an alchohol use  "disorder):411860}    Last PSA:   Prostate Specific Antigen Screen   Date Value Ref Range Status   09/14/2020 2.7 0.0 - 3.5 ng/mL Final       Reviewed orders with patient. Reviewed health maintenance and updated orders accordingly - { :188246::\"Yes\"}  {Chronicprobdata (optional):114722}    Reviewed and updated as needed this visit by clinical staff  Tobacco  Allergies  Meds              Reviewed and updated as needed this visit by Provider                {HISTORY OPTIONS (Optional):380755}    Review of Systems  {MALE ROS (Optional):009922::\"CONSTITUTIONAL: NEGATIVE for fever, chills, change in weight\",\"INTEGUMENTARY/SKIN: NEGATIVE for worrisome rashes, moles or lesions\",\"EYES: NEGATIVE for vision changes or irritation\",\"ENT: NEGATIVE for ear, mouth and throat problems\",\"RESP: NEGATIVE for significant cough or SOB\",\"CV: NEGATIVE for chest pain, palpitations or peripheral edema\",\"GI: NEGATIVE for nausea, abdominal pain, heartburn, or change in bowel habits\",\" male: negative for dysuria, hematuria, decreased urinary stream, erectile dysfunction, urethral discharge\",\"MUSCULOSKELETAL: NEGATIVE for significant arthralgias or myalgia\",\"NEURO: NEGATIVE for weakness, dizziness or paresthesias\",\"PSYCHIATRIC: NEGATIVE for changes in mood or affect\"}    OBJECTIVE:   /80 (BP Location: Right arm, Patient Position: Sitting)   Pulse 62   Ht 1.905 m (6' 3\")   Wt 121.1 kg (267 lb)   SpO2 96%   BMI 33.37 kg/m      Physical Exam  {Exam Choices (Optional):651846}    {Diagnostic Test Results (Optional):350477::\"Diagnostic Test Results:\",\"Labs reviewed in Epic\"}    ASSESSMENT/PLAN:   {Diag Picklist:219159}    Patient has been advised of split billing requirements and indicates understanding: {YES / NO:146812::\"Yes\"}  COUNSELING:   {MALE COUNSELING MESSAGES:711690::\"Reviewed preventive health counseling, as reflected in patient instructions\"}    Estimated body mass index is 33.37 kg/m  as calculated from the following:    " "Height as of this encounter: 1.905 m (6' 3\").    Weight as of this encounter: 121.1 kg (267 lb).     {Weight Management Plan (ACO) Complete if BMI is abnormal-  Ages 18-64  BMI >24.9.  Age 65+ with BMI <23 or >30 (Optional):703128}    He reports that he has never smoked. He has never used smokeless tobacco.      Counseling Resources:  ATP IV Guidelines  Pooled Cohorts Equation Calculator  FRAX Risk Assessment  ICSI Preventive Guidelines  Dietary Guidelines for Americans, 2010  USDA's MyPlate  ASA Prophylaxis  Lung CA Screening    Rafael Perry MD  [facility]  "

## 2021-09-24 NOTE — LETTER
September 27, 2021      Marquis Martínez  [address] [place] [address]        Dear ,    We are writing to inform you of your test results.  PSA has climbed too rapidly in 1 year and I would suggest [facility] consultation with Dr. Roly Quinones or Dr. Stephan Campos at [phone].  I will place an order for [facility] consult for rising PSA.    All other labs are quite good.          Resulted Orders   CBC with platelets   Result Value Ref Range    WBC Count 6.8 4.0 - 11.0 10e3/uL    RBC Count 4.51 4.40 - 5.90 10e6/uL    Hemoglobin 14.4 13.3 - 17.7 g/dL    Hematocrit 41.2 40.0 - 53.0 %    MCV 91 78 - 100 fL    MCH 31.9 26.5 - 33.0 pg    MCHC 35.0 31.5 - 36.5 g/dL    RDW 11.8 10.0 - 15.0 %    Platelet Count 271 150 - 450 10e3/uL   Comprehensive metabolic panel   Result Value Ref Range    Sodium 139 136 - 145 mmol/L    Potassium 4.2 3.5 - 5.0 mmol/L    Chloride 103 98 - 107 mmol/L    Carbon Dioxide (CO2) 25 22 - 31 mmol/L    Anion Gap 11 5 - 18 mmol/L    Urea Nitrogen 17 8 - 22 mg/dL    Creatinine 1.03 0.70 - 1.30 mg/dL    Calcium 9.8 8.5 - 10.5 mg/dL    Glucose 105 70 - 125 mg/dL    Alkaline Phosphatase 66 45 - 120 U/L    AST 23 0 - 40 U/L    ALT 20 0 - 45 U/L    Protein Total 7.0 6.0 - 8.0 g/dL    Albumin 4.3 3.5 - 5.0 g/dL    Bilirubin Total 0.9 0.0 - 1.0 mg/dL    GFR Estimate 81 >60 mL/min/1.73m2      Comment:      As of July 11, 2021, eGFR is calculated by the CKD-EPI creatinine equation, without race adjustment. eGFR can be influenced by muscle mass, exercise, and diet. The reported eGFR is an estimation only and is only applicable if the renal function is stable.   TSH   Result Value Ref Range    TSH 2.05 0.30 - 5.00 uIU/mL   Prostate Specific Antigen Screen   Result Value Ref Range    Prostate Specific Antigen Screen 4.30 (H) 0.00 - 3.50 ug/L   Lipid panel reflex to direct LDL Fasting   Result Value Ref Range    Cholesterol 190 <=199 mg/dL    Triglycerides 279 (H) <=149  mg/dL    Direct Measure HDL 44 >=40 mg/dL      Comment:      HDL Cholesterol Reference Range:     0-2 years:   No reference ranges established for patients under 2 years old  at [facility] for lipid analytes.    2-8 years:  Greater than 45 mg/dL     18 years and older:   Female: Greater than or equal to 50 mg/dL   Male:   Greater than or equal to 40 mg/dL    LDL Cholesterol Calculated 90 <=129 mg/dL    Patient Fasting > 8hrs? Yes    UA reflex to Microscopic and Culture   Result Value Ref Range    Color Urine Yellow Colorless, Straw, Light Yellow, Yellow    Appearance Urine Clear Clear    Glucose Urine Negative Negative mg/dL    Bilirubin Urine Negative Negative    Ketones Urine Negative Negative mg/dL    Specific Gravity Urine 1.025 1.005 - 1.030    Blood Urine Negative Negative    pH Urine 5.0 5.0 - 8.0    Protein Albumin Urine Negative Negative mg/dL    Urobilinogen Urine 0.2 0.2, 1.0 E.U./dL    Nitrite Urine Negative Negative    Leukocyte Esterase Urine Negative Negative    Narrative    Microscopic not indicated       If you have any questions or concerns, please call the clinic at the number listed above.       Sincerely,      Rafael Perry MD

## 2021-10-04 DIAGNOSIS — N52.9 ERECTILE DYSFUNCTION, UNSPECIFIED ERECTILE DYSFUNCTION TYPE: Primary | ICD-10-CM

## 2021-10-04 RX ORDER — SILDENAFIL 100 MG/1
TABLET, FILM COATED ORAL
Qty: 15 TABLET | Refills: 3 | Status: SHIPPED | OUTPATIENT
Start: 2021-10-04 | End: 2021-12-05

## 2021-10-20 ENCOUNTER — TELEPHONE (OUTPATIENT)
Dept: INTERNAL MEDICINE | Facility: CLINIC | Age: 56
End: 2021-10-20

## 2021-10-20 NOTE — TELEPHONE ENCOUNTER
Patient shares that at his last appointment 9/24/2021 discussed with Dr. Perry switching to Crestor.    Wife yelling in back ground - Dr. Perry wanted to review lab results.    Pt is requesting RX for Crestor    Preferred pharmacy is [facility] on [address].    Wife begins yelling again in back ground wants Maru to call Marquis tomorrow and close the loop.  Wants to make sure RX is going to be ordered and sent.    Best number to reach patient is

## 2021-10-21 DIAGNOSIS — E78.5 HLD (HYPERLIPIDEMIA): Primary | ICD-10-CM

## 2021-10-21 RX ORDER — ROSUVASTATIN CALCIUM 10 MG/1
10 TABLET, COATED ORAL DAILY
Qty: 90 TABLET | Refills: 3 | Status: SHIPPED | OUTPATIENT
Start: 2021-10-21 | End: 2022-09-20

## 2021-11-10 DIAGNOSIS — I10 HTN (HYPERTENSION): Primary | ICD-10-CM

## 2021-11-10 RX ORDER — LOSARTAN POTASSIUM AND HYDROCHLOROTHIAZIDE 25; 100 MG/1; MG/1
1 TABLET ORAL DAILY
Qty: 90 TABLET | Refills: 0 | Status: SHIPPED | OUTPATIENT
Start: 2021-11-10 | End: 2022-02-11

## 2021-11-10 NOTE — TELEPHONE ENCOUNTER
"Routing refill request to provider for review/approval because:  Allergy Contraindication.     Last Written Prescription Date:  5/4/21  Last Fill Quantity: 90,  # refills: 1   Last office visit provider:  9/24/21         Requested Prescriptions   Pending Prescriptions Disp Refills     losartan-hydrochlorothiazide (HYZAAR) 100-25 MG tablet 90 tablet 0     Sig: Take 1 tablet by mouth daily       Angiotensin-II Receptors Passed - 11/10/2021 11:16 AM        Passed - Last blood pressure under 140/90 in past 12 months     BP Readings from Last 3 Encounters:   09/24/21 120/80   09/14/20 124/80   01/18/12 136/78                 Passed - Recent (12 mo) or future (30 days) visit within the authorizing provider's specialty     Patient has had an office visit with the authorizing provider or a provider within the authorizing providers department within the previous 12 mos or has a future within next 30 days. See \"Patient Info\" tab in inbasket, or \"Choose Columns\" in Meds & Orders section of the refill encounter.              Passed - Medication is active on med list        Passed - Patient is age 18 or older        Passed - Normal serum creatinine on file in past 12 months     Recent Labs   Lab Test 09/24/21  0923   CR 1.03       Ok to refill medication if creatinine is low          Passed - Normal serum potassium on file in past 12 months     Recent Labs   Lab Test 09/24/21  0923   POTASSIUM 4.2                   Diuretics (Including Combos) Protocol Passed - 11/10/2021 11:16 AM        Passed - Blood pressure under 140/90 in past 12 months     BP Readings from Last 3 Encounters:   09/24/21 120/80   09/14/20 124/80   01/18/12 136/78                 Passed - Recent (12 mo) or future (30 days) visit within the authorizing provider's specialty     Patient has had an office visit with the authorizing provider or a provider within the authorizing providers department within the previous 12 mos or has a future within next 30 days. " "See \"Patient Info\" tab in inbasket, or \"Choose Columns\" in Meds & Orders section of the refill encounter.              Passed - Medication is active on med list        Passed - Patient is age 18 or older        Passed - Normal serum creatinine on file in past 12 months     Recent Labs   Lab Test 09/24/21  0923   CR 1.03              Passed - Normal serum potassium on file in past 12 months     Recent Labs   Lab Test 09/24/21  0923   POTASSIUM 4.2                    Passed - Normal serum sodium on file in past 12 months     Recent Labs   Lab Test 09/24/21  0923                      Miriam Dang RN 11/10/21 11:16 AM  "

## 2021-11-10 NOTE — TELEPHONE ENCOUNTER
Patient requested refill last week from [facility]'s pharmacy for Losartan.    Pt was advised today to reach out to clinic.    Losartan 100-25 MG tablet.    Patient is out of [facility]    Pt is requesting 90 day quantity with refills.    [facility] on [address] [facility] in [place] is the preferred pharmacy.    Last office visit with PCP = 09/24/2021

## 2021-11-18 DIAGNOSIS — E78.5 HLD (HYPERLIPIDEMIA): Primary | ICD-10-CM

## 2021-11-19 RX ORDER — SIMVASTATIN 20 MG
TABLET ORAL
Qty: 90 TABLET | Refills: 3 | Status: SHIPPED | OUTPATIENT
Start: 2021-11-19 | End: 2022-09-27

## 2021-11-20 NOTE — TELEPHONE ENCOUNTER
" Disp Refills Start End ABBY   simvastatin (ZOCOR) 20 MG tablet 90 tablet 1 5/24/2021  No   Sig: TAKE 1 TABLET BY MOUTH AT BEDTIME   Sent to pharmacy as: simvastatin 20 mg tablet (ZOCOR)   E-Prescribing Status: Receipt confirmed by pharmacy (5/24/2021 11:05 PM CDT)     Last Written Prescription Date:  05/24/2021  Last Fill Quantity: 90,  # refills: 1   Last office visit provider:  09/24/2021 with Dr Perry.    Requested Prescriptions   Pending Prescriptions Disp Refills     simvastatin (ZOCOR) 20 MG tablet [Pharmacy Med Name: SIMVASTATIN 20MG TABLETS] 90 tablet      Sig: TAKE 1 TABLET BY MOUTH AT BEDTIME       Statins Protocol Passed - 11/18/2021  3:41 AM        Passed - LDL on file in past 12 months     Recent Labs   Lab Test 09/24/21  0923   LDL 90             Passed - No abnormal creatine kinase in past 12 months     No lab results found.             Passed - Recent (12 mo) or future (30 days) visit within the authorizing provider's specialty     Patient has had an office visit with the authorizing provider or a provider within the authorizing providers department within the previous 12 mos or has a future within next 30 days. See \"Patient Info\" tab in inbasket, or \"Choose Columns\" in Meds & Orders section of the refill encounter.              Passed - Medication is active on med list        Passed - Patient is age 18 or older             aBrbara Hernandez 11/19/21 11:24 PM  "

## 2021-12-02 ENCOUNTER — TRANSFERRED RECORDS (OUTPATIENT)
Dept: HEALTH INFORMATION MANAGEMENT | Facility: CLINIC | Age: 56
End: 2021-12-02
Payer: COMMERCIAL

## 2021-12-02 DIAGNOSIS — N52.9 ERECTILE DYSFUNCTION, UNSPECIFIED ERECTILE DYSFUNCTION TYPE: ICD-10-CM

## 2021-12-05 RX ORDER — SILDENAFIL 100 MG/1
TABLET, FILM COATED ORAL
Qty: 30 TABLET | Refills: 3 | Status: SHIPPED | OUTPATIENT
Start: 2021-12-05 | End: 2022-02-11

## 2022-01-20 DIAGNOSIS — N52.9 ERECTILE DYSFUNCTION, UNSPECIFIED ERECTILE DYSFUNCTION TYPE: ICD-10-CM

## 2022-01-23 RX ORDER — SILDENAFIL 100 MG/1
TABLET, FILM COATED ORAL
Qty: 15 TABLET | Refills: 3 | OUTPATIENT
Start: 2022-01-23

## 2022-02-10 ENCOUNTER — TELEPHONE (OUTPATIENT)
Dept: INTERNAL MEDICINE | Facility: CLINIC | Age: 57
End: 2022-02-10
Payer: COMMERCIAL

## 2022-02-10 NOTE — TELEPHONE ENCOUNTER
Pt is requesting RX for two medications to got to two different pharmacies.    Losartan RX is to go to [facility] on [address] [facility] [address] [facility] in [place].    Sildenafil RX is to go to [facility].

## 2022-02-11 DIAGNOSIS — N52.9 ERECTILE DYSFUNCTION, UNSPECIFIED ERECTILE DYSFUNCTION TYPE: ICD-10-CM

## 2022-02-11 DIAGNOSIS — I10 HTN (HYPERTENSION): ICD-10-CM

## 2022-02-11 RX ORDER — SILDENAFIL 100 MG/1
TABLET, FILM COATED ORAL
Qty: 30 TABLET | Refills: 3 | Status: SHIPPED | OUTPATIENT
Start: 2022-02-11 | End: 2022-08-30

## 2022-02-11 RX ORDER — SILDENAFIL 100 MG/1
TABLET, FILM COATED ORAL
Qty: 30 TABLET | Refills: 3 | Status: CANCELLED | OUTPATIENT
Start: 2022-02-11

## 2022-02-11 RX ORDER — LOSARTAN POTASSIUM AND HYDROCHLOROTHIAZIDE 25; 100 MG/1; MG/1
1 TABLET ORAL DAILY
Qty: 90 TABLET | Refills: 3 | Status: SHIPPED | OUTPATIENT
Start: 2022-02-11 | End: 2022-12-06

## 2022-06-14 ENCOUNTER — TRANSFERRED RECORDS (OUTPATIENT)
Dept: HEALTH INFORMATION MANAGEMENT | Facility: CLINIC | Age: 57
End: 2022-06-14
Payer: COMMERCIAL

## 2022-07-19 ENCOUNTER — TRANSFERRED RECORDS (OUTPATIENT)
Dept: HEALTH INFORMATION MANAGEMENT | Facility: CLINIC | Age: 57
End: 2022-07-19

## 2022-08-09 ENCOUNTER — TRANSFERRED RECORDS (OUTPATIENT)
Dept: HEALTH INFORMATION MANAGEMENT | Facility: CLINIC | Age: 57
End: 2022-08-09

## 2022-08-18 ENCOUNTER — LAB (OUTPATIENT)
Dept: LAB | Facility: CLINIC | Age: 57
End: 2022-08-18
Payer: COMMERCIAL

## 2022-08-18 ENCOUNTER — TELEPHONE (OUTPATIENT)
Dept: LAB | Facility: CLINIC | Age: 57
End: 2022-08-18

## 2022-08-18 DIAGNOSIS — Z12.5 SPECIAL SCREENING FOR MALIGNANT NEOPLASM OF PROSTATE: ICD-10-CM

## 2022-08-18 DIAGNOSIS — Z12.5 SCREENING FOR PROSTATE CANCER: Primary | ICD-10-CM

## 2022-08-18 DIAGNOSIS — Z12.5 SCREENING FOR PROSTATE CANCER: ICD-10-CM

## 2022-08-30 DIAGNOSIS — N52.9 ERECTILE DYSFUNCTION, UNSPECIFIED ERECTILE DYSFUNCTION TYPE: ICD-10-CM

## 2022-08-30 RX ORDER — SILDENAFIL 100 MG/1
TABLET, FILM COATED ORAL
Qty: 30 TABLET | Refills: 0 | Status: SHIPPED | OUTPATIENT
Start: 2022-08-30 | End: 2022-09-27

## 2022-08-31 NOTE — TELEPHONE ENCOUNTER
"Last Written Prescription Date:  2/11/22  Last Fill Quantity: 30,  # refills: 3   Last office visit provider:  9/24/21     Requested Prescriptions   Pending Prescriptions Disp Refills     sildenafil (VIAGRA) 100 MG tablet [Pharmacy Med Name: SILDENAFIL CITRATE 100 MG T 100 Tablet] 30 tablet 3     Sig: TAKE 1/4 TO 1/2 TABLET BY MOUTH DAILY AS NEEDED FOR ERECTILE DYSFUNCTION.       Erectile Dysfuction Protocol Passed - 8/30/2022 11:33 AM        Passed - Absence of nitrates on medication list        Passed - Absence of Alpha Blockers on Med list        Passed - Recent (12 mo) or future (30 days) visit within the authorizing provider's specialty     Patient has had an office visit with the authorizing provider or a provider within the authorizing providers department within the previous 12 mos or has a future within next 30 days. See \"Patient Info\" tab in inbasket, or \"Choose Columns\" in Meds & Orders section of the refill encounter.              Passed - Medication is active on med list        Passed - Patient is age 18 or older             Monserrat Cyr RN 08/30/22 7:06 PM  "

## 2022-09-18 DIAGNOSIS — E78.5 HLD (HYPERLIPIDEMIA): ICD-10-CM

## 2022-09-20 RX ORDER — ROSUVASTATIN CALCIUM 10 MG/1
10 TABLET, COATED ORAL DAILY
Qty: 90 TABLET | Refills: 0 | Status: SHIPPED | OUTPATIENT
Start: 2022-09-20 | End: 2022-12-06

## 2022-09-20 NOTE — TELEPHONE ENCOUNTER
"Last Written Prescription Date:  10/21/21  Last Fill Quantity: 90,  # refills: 3   Last office visit provider:  9/24/21     Requested Prescriptions   Pending Prescriptions Disp Refills     rosuvastatin (CRESTOR) 10 MG tablet [Pharmacy Med Name: ROSUVASTATIN 10MG TABLETS] 90 tablet 3     Sig: TAKE 1 TABLET(10 MG) BY MOUTH DAILY       Statins Protocol Passed - 9/20/2022  7:43 AM        Passed - LDL on file in past 12 months     Recent Labs   Lab Test 09/24/21  0923   LDL 90             Passed - No abnormal creatine kinase in past 12 months     No lab results found.             Passed - Recent (12 mo) or future (30 days) visit within the authorizing provider's specialty     Patient has had an office visit with the authorizing provider or a provider within the authorizing providers department within the previous 12 mos or has a future within next 30 days. See \"Patient Info\" tab in inbasket, or \"Choose Columns\" in Meds & Orders section of the refill encounter.              Passed - Medication is active on med list        Passed - Patient is age 18 or older             Vidal Calvillo RN 09/20/22 7:43 AM  "

## 2022-09-27 ENCOUNTER — OFFICE VISIT (OUTPATIENT)
Dept: INTERNAL MEDICINE | Facility: CLINIC | Age: 57
End: 2022-09-27
Payer: COMMERCIAL

## 2022-09-27 VITALS
SYSTOLIC BLOOD PRESSURE: 120 MMHG | DIASTOLIC BLOOD PRESSURE: 92 MMHG | HEIGHT: 75 IN | OXYGEN SATURATION: 97 % | WEIGHT: 265 LBS | BODY MASS INDEX: 32.95 KG/M2 | HEART RATE: 80 BPM

## 2022-09-27 DIAGNOSIS — H43.392 FLOATERS IN VISUAL FIELD, LEFT: ICD-10-CM

## 2022-09-27 DIAGNOSIS — I10 ESSENTIAL HYPERTENSION: ICD-10-CM

## 2022-09-27 DIAGNOSIS — H92.02 EAR PAIN, LEFT: Primary | ICD-10-CM

## 2022-09-27 DIAGNOSIS — J02.9 SORE THROAT: ICD-10-CM

## 2022-09-27 PROCEDURE — 99214 OFFICE O/P EST MOD 30 MIN: CPT | Performed by: INTERNAL MEDICINE

## 2022-09-27 RX ORDER — CLINDAMYCIN PHOSPHATE 10 MG/G
GEL TOPICAL
COMMUNITY
Start: 2022-06-30

## 2022-09-27 RX ORDER — TADALAFIL 5 MG/1
5 TABLET ORAL
COMMUNITY
Start: 2022-04-30 | End: 2022-12-09

## 2022-09-27 NOTE — PROGRESS NOTES
"  Assessment & Plan     Ear pain, left  I suspect is trauma to the external ear canal from Q-tips.  He was told not to use Q-tips anymore.  There is no ear drum inflammation or pressure.    Floaters in visual field, left  Patient did have some flashing lights yesterday and a floater in the left eye today.  Vision symptoms have lessened and still normal vision.  No obvious abnormality in the exam on the left eye, but he was told to see his ophthalmologist this week for immediate evaluation of his symptoms.    Essential hypertension  Controlled, although borderline high diastolic.  He does plan to follow-up with his primary care provider this fall for physical exam.  Continue on Hyzaar.    Sore throat  Moderate diffuse irritative pharyngitis.  Possibly from stress.  Patient was told that this could be an early viral URI, but no fever or severe symptoms.  He is vaccinated for COVID.  Follow-up as needed if those symptoms worsen.    History of prostate cancer, follow-up with primary care for physical this fall.               BMI:   Estimated body mass index is 33.12 kg/m  as calculated from the following:    Height as of this encounter: 1.905 m (6' 3\").    Weight as of this encounter: 120.2 kg (265 lb).           Return in about 4 weeks (around 10/25/2022) for Routine preventive.    Michael Sharma MD  [facility]    Everardo Cho is a 57 year old, presenting for the following health issues:  Ear Fullness (Left ear plugged since last night, slight sore throat, no cough, eye floaters)  Otherwise healthy male with past history of hypertension hypercholesterolemia, but has not had a vascular event.  Non-smoker.    Yesterday patient noticed a slight sore throat, although that is better today.  No cough or fever.    He developed some mild left ear discomfort and some ringing in the ear in his left ear over the past 24 hours.  No symptoms on the right ear.  No generalized headache.    He " "denies significant nasal congestion or purulent drainage.    He was bending down to pump up his kids bike tires yesterday and one standing up he saw some flashing lights in the peripheral visual field area.  Lasted a few minutes.  He is noticed a floater in his left eye today, just one floater, like a string.  No sudden loss of vision.    No chest pain or palpitations.  No GI symptoms.  No rash.  No swallowing difficulty.    No previous eye events.    History of prostate cancer with surgery in April of this year.    Hypertension has been controlled and was controlled last year at physical.  He is on Hyzaar 100/25 mg a day and Crestor.    HPI           Review of Systems         Objective    BP (!) 120/92 (BP Location: Right arm, Patient Position: Sitting)   Pulse 80   Ht 1.905 m (6' 3\")   Wt 120.2 kg (265 lb)   SpO2 97%   BMI 33.12 kg/m    Body mass index is 33.12 kg/m .  Physical Exam   Otherwise healthy-appearing male.  Gait is normal.  Speech is normal.  Tympanic membranes are normal.  There is no effusion or inflammation.  He does have evidence of trauma at the 6 o'clock position on his left ear from a Q-tip.  No otitis externa.  Periorbital skin is normal and no vesicular eruption.  No cervical or supraclavicular adenopathy.  He does have diffuse erythema of his pharynx, looks more like an irritative pharynx, possible small aphthous ulcer on the left tonsillar pillar.  But no exudate.  Teeth in otherwise good condition.  Lungs are clear.    Nondilated eye fundal exam shows a normal optic disc and vascularity and I did not see any floaters or debris.  Pupils irises are equal and reactive.  Extraocular muscles intact.  Periorbital tissues appear normal.                    "

## 2022-09-27 NOTE — PATIENT INSTRUCTIONS
Your ophthalmologist as soon as possible to get further evaluation on the eye floaters.    Do not use Q-tips anymore in your ear.    Get reevaluated if you do develop a fever, worsening ear pain or other new symptoms.    Otherwise follow-up with your primary care provider for physical exam this fall.

## 2022-10-25 ENCOUNTER — ALLIED HEALTH/NURSE VISIT (OUTPATIENT)
Dept: FAMILY MEDICINE | Facility: CLINIC | Age: 57
End: 2022-10-25
Payer: COMMERCIAL

## 2022-10-25 DIAGNOSIS — Z23 HIGH PRIORITY FOR 2019-NCOV VACCINE: ICD-10-CM

## 2022-10-25 DIAGNOSIS — Z23 NEED FOR PROPHYLACTIC VACCINATION AND INOCULATION AGAINST INFLUENZA: ICD-10-CM

## 2022-10-25 PROCEDURE — 99207 PR NO CHARGE NURSE ONLY: CPT

## 2022-10-25 PROCEDURE — 0134A COVID-19,PF,MODERNA BIVALENT: CPT

## 2022-10-25 PROCEDURE — 90682 RIV4 VACC RECOMBINANT DNA IM: CPT

## 2022-10-25 PROCEDURE — 90471 IMMUNIZATION ADMIN: CPT

## 2022-10-25 PROCEDURE — 91313 COVID-19,PF,MODERNA BIVALENT: CPT

## 2022-10-31 ENCOUNTER — TRANSFERRED RECORDS (OUTPATIENT)
Dept: HEALTH INFORMATION MANAGEMENT | Facility: CLINIC | Age: 57
End: 2022-10-31

## 2022-12-01 ENCOUNTER — TRANSFERRED RECORDS (OUTPATIENT)
Dept: HEALTH INFORMATION MANAGEMENT | Facility: CLINIC | Age: 57
End: 2022-12-01

## 2022-12-06 ENCOUNTER — OFFICE VISIT (OUTPATIENT)
Dept: INTERNAL MEDICINE | Facility: CLINIC | Age: 57
End: 2022-12-06
Payer: COMMERCIAL

## 2022-12-06 VITALS
BODY MASS INDEX: 32.58 KG/M2 | WEIGHT: 262 LBS | DIASTOLIC BLOOD PRESSURE: 82 MMHG | SYSTOLIC BLOOD PRESSURE: 120 MMHG | HEART RATE: 72 BPM | HEIGHT: 75 IN | TEMPERATURE: 97.1 F

## 2022-12-06 DIAGNOSIS — Z12.5 SCREENING FOR PROSTATE CANCER: ICD-10-CM

## 2022-12-06 DIAGNOSIS — Z12.5 SPECIAL SCREENING FOR MALIGNANT NEOPLASM OF PROSTATE: ICD-10-CM

## 2022-12-06 DIAGNOSIS — Z00.00 ROUTINE GENERAL MEDICAL EXAMINATION AT A HEALTH CARE FACILITY: ICD-10-CM

## 2022-12-06 DIAGNOSIS — N52.9 ERECTILE DYSFUNCTION, UNSPECIFIED ERECTILE DYSFUNCTION TYPE: ICD-10-CM

## 2022-12-06 DIAGNOSIS — I10 ESSENTIAL HYPERTENSION: Primary | ICD-10-CM

## 2022-12-06 LAB
ALBUMIN SERPL BCG-MCNC: 4.5 G/DL (ref 3.5–5.2)
ALBUMIN UR-MCNC: NEGATIVE MG/DL
ALP SERPL-CCNC: 60 U/L (ref 40–129)
ALT SERPL W P-5'-P-CCNC: 15 U/L (ref 10–50)
ANION GAP SERPL CALCULATED.3IONS-SCNC: 12 MMOL/L (ref 7–15)
APPEARANCE UR: CLEAR
AST SERPL W P-5'-P-CCNC: 22 U/L (ref 10–50)
BILIRUB SERPL-MCNC: 0.4 MG/DL
BILIRUB UR QL STRIP: NEGATIVE
BUN SERPL-MCNC: 15.7 MG/DL (ref 6–20)
CALCIUM SERPL-MCNC: 9.3 MG/DL (ref 8.6–10)
CHLORIDE SERPL-SCNC: 101 MMOL/L (ref 98–107)
CHOLEST SERPL-MCNC: 192 MG/DL
COLOR UR AUTO: YELLOW
CREAT SERPL-MCNC: 1.06 MG/DL (ref 0.67–1.17)
DEPRECATED HCO3 PLAS-SCNC: 26 MMOL/L (ref 22–29)
ERYTHROCYTE [DISTWIDTH] IN BLOOD BY AUTOMATED COUNT: 11.8 % (ref 10–15)
GFR SERPL CREATININE-BSD FRML MDRD: 82 ML/MIN/1.73M2
GLUCOSE SERPL-MCNC: 94 MG/DL (ref 70–99)
GLUCOSE UR STRIP-MCNC: NEGATIVE MG/DL
HCT VFR BLD AUTO: 42.5 % (ref 40–53)
HDLC SERPL-MCNC: 56 MG/DL
HGB BLD-MCNC: 14.7 G/DL (ref 13.3–17.7)
HGB UR QL STRIP: NEGATIVE
KETONES UR STRIP-MCNC: NEGATIVE MG/DL
LDLC SERPL CALC-MCNC: 101 MG/DL
LEUKOCYTE ESTERASE UR QL STRIP: NEGATIVE
MCH RBC QN AUTO: 31.9 PG (ref 26.5–33)
MCHC RBC AUTO-ENTMCNC: 34.6 G/DL (ref 31.5–36.5)
MCV RBC AUTO: 92 FL (ref 78–100)
NITRATE UR QL: NEGATIVE
NONHDLC SERPL-MCNC: 136 MG/DL
PH UR STRIP: 5.5 [PH] (ref 5–8)
PLATELET # BLD AUTO: 286 10E3/UL (ref 150–450)
POTASSIUM SERPL-SCNC: 4.5 MMOL/L (ref 3.4–5.3)
PROT SERPL-MCNC: 7 G/DL (ref 6.4–8.3)
PSA SERPL-MCNC: <0.01 NG/ML (ref 0–3.5)
RBC # BLD AUTO: 4.61 10E6/UL (ref 4.4–5.9)
SODIUM SERPL-SCNC: 139 MMOL/L (ref 136–145)
SP GR UR STRIP: 1.02 (ref 1–1.03)
TRIGL SERPL-MCNC: 175 MG/DL
TSH SERPL DL<=0.005 MIU/L-ACNC: 1.54 UIU/ML (ref 0.3–4.2)
UROBILINOGEN UR STRIP-ACNC: 0.2 E.U./DL
WBC # BLD AUTO: 9.9 10E3/UL (ref 4–11)

## 2022-12-06 PROCEDURE — G0103 PSA SCREENING: HCPCS | Performed by: INTERNAL MEDICINE

## 2022-12-06 PROCEDURE — 80053 COMPREHEN METABOLIC PANEL: CPT | Performed by: INTERNAL MEDICINE

## 2022-12-06 PROCEDURE — 84443 ASSAY THYROID STIM HORMONE: CPT | Performed by: INTERNAL MEDICINE

## 2022-12-06 PROCEDURE — 36415 COLL VENOUS BLD VENIPUNCTURE: CPT | Performed by: INTERNAL MEDICINE

## 2022-12-06 PROCEDURE — 80061 LIPID PANEL: CPT | Performed by: INTERNAL MEDICINE

## 2022-12-06 PROCEDURE — 81003 URINALYSIS AUTO W/O SCOPE: CPT | Performed by: INTERNAL MEDICINE

## 2022-12-06 PROCEDURE — 99396 PREV VISIT EST AGE 40-64: CPT | Performed by: INTERNAL MEDICINE

## 2022-12-06 PROCEDURE — 85027 COMPLETE CBC AUTOMATED: CPT | Performed by: INTERNAL MEDICINE

## 2022-12-06 RX ORDER — ROSUVASTATIN CALCIUM 10 MG/1
10 TABLET, COATED ORAL DAILY
Qty: 90 TABLET | Refills: 0 | Status: SHIPPED | OUTPATIENT
Start: 2022-12-06 | End: 2023-01-23

## 2022-12-06 RX ORDER — LOSARTAN POTASSIUM AND HYDROCHLOROTHIAZIDE 25; 100 MG/1; MG/1
1 TABLET ORAL DAILY
Qty: 90 TABLET | Refills: 3 | Status: SHIPPED | OUTPATIENT
Start: 2022-12-06 | End: 2023-02-10

## 2022-12-06 ASSESSMENT — ENCOUNTER SYMPTOMS
ARTHRALGIAS: 1
DIZZINESS: 0
FEVER: 0
PALPITATIONS: 0
MYALGIAS: 0
HEMATOCHEZIA: 0
FREQUENCY: 0
EYE PAIN: 0
HEARTBURN: 0
JOINT SWELLING: 0
ABDOMINAL PAIN: 0
CONSTIPATION: 0
WEAKNESS: 0
HEMATURIA: 0
PARESTHESIAS: 0
COUGH: 0
SORE THROAT: 0
DIARRHEA: 0
CHILLS: 0
DYSURIA: 0
SHORTNESS OF BREATH: 0
HEADACHES: 0
NERVOUS/ANXIOUS: 0
NAUSEA: 0

## 2022-12-06 NOTE — PROGRESS NOTES
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    Non-smoker social alcohol.  Allergy penicillin amlodipine lisinopril plus shrimp.        ROS: A comprehensive review of systems was performed and was otherwise negative    Medications:   Prior to Admission medications    Medication Sig Start Date End Date Taking? Authorizing Provider   losartan-hydrochlorothiazide (HYZAAR) 100-25 MG tablet Take 1 tablet by mouth daily 12/6/22  Yes Rafael Perry MD   rosuvastatin (CRESTOR) 10 MG tablet Take 1 tablet (10 mg) by mouth daily 12/6/22  Yes Rafael Perry MD   clindamycin (CLINDAMAX) 1 % external gel APPLY TOPICALLY TO FACE DAILY 6/30/22   Reported, Patient   tadalafil (CIALIS) 5 MG tablet Take 5 mg by mouth 4/30/22   Reported, Patient       Allergies:  Allergies   Allergen Reactions     Amlodipine Unknown     ankle swelling       Lisinopril-Hydrochlorothiazide Unknown     decreased libido       Tetanus Vaccines And Toxoid [Tetanus Toxoids] Unknown       Immunizations:   Immunization History   Administered Date(s) Administered     COVID-19 Vaccine 18+ (Moderna) 03/10/2021, 04/07/2021, 11/24/2021     COVID-19 Vaccine Bivalent Booster 18+ (Moderna) 10/25/2022     DT (PEDS <7y) 07/01/2004     FLU 6-35 months 09/25/2009     Flu, Unspecified 01/12/2012     Influenza (IIV3) PF 11/13/2014     Influenza Vaccine 50-64 or 18-64 w/egg allergy (Flublok) 09/24/2021, 10/25/2022     Influenza Vaccine >6 months (Alfuria,Fluzone) 10/31/2019, 11/04/2020     Influenza Vaccine, 6+MO IM (QUADRIVALENT W/PRESERVATIVES) 11/28/2008, 11/13/2014, 09/10/2015, 09/21/2016      Td,adult,historic,unspecified 2004     Tdap (Adacel,Boostrix) 2011, 01/15/2012, 2020       Health Maintenance: Immunizations reviewed and up-to-date he did have COVID illness that affected his whole family in 2021 fully recovered.    Past Medical History: Hypertension and hyperlipidemia and prostate cancer.  Status post prostatectomy at [facility] 2022 Dr. Espitia good results latest PSA done 2022 not detectable are less than 0.1.  Not repeated by this examiner at this time.  Patient wished to defer digital rectal exam as well.    Past Surgical History: No anesthetic complications from any prior surgeries including prostate resection 2022 [facility] Dr. Cummins [facility].    Rotator cuff repair right side and left side problem.  History of hypertension and hyperlipidemia no target organ damage related to same.    Family History: Mother  90 perforated bowel Father living 90.  Good health.  For his age.  3 children well lost 120 25 weeks gestation.  Wife well.    Social History: [employer].  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  Losing pitcher and that being 10 championship game.    Exam easily conversant good spirited large ball and large muscle mass short cropped hair appears younger than stated age head eyes ears nose throat negative the patient wished to defer the genital and rectal exam is done recently at [facility] 2022 when PSA was checked and not detectable less than 0.1.  Back straight no severe spine tenderness chest clear heart tones normal abdomen obese no organomegaly masses or tenderness good pulse noted all 4 extremities no carotid bruits thyromegaly.  Good muscle tone extremities are free of edema cyanosis or clubbing.  He is easily conversant good spirited intelligent.  Happy.    Assessment and Plan  General medical  examination at health care facility today check hemogram plus comprehensive metabolic profile urinalysis TSH and lipid panel.    The following high BMI interventions were performed this visit: encouragement to exercise    Rafael Perry MD    Patient Active Problem List   Diagnosis   (none) - all problems resolved or deleted     Answers for HPI/ROS submitted by the patient on 12/6/2022  Frequency of exercise:: 2-3 days/week  Getting at least 3 servings of Calcium per day:: Yes  Diet:: Carbohydrate counting  Taking medications regularly:: Yes  Medication side effects:: None  Bi-annual eye exam:: Yes  Dental care twice a year:: Yes  Sleep apnea or symptoms of sleep apnea:: None  abdominal pain: No  Blood in stool: No  Blood in urine: No  chest pain: No  chills: No  congestion: No  constipation: No  cough: No  diarrhea: No  dizziness: No  ear pain: No  eye pain: No  nervous/anxious: No  fever: No  frequency: No  genital sores: No  headaches: No  hearing loss: No  heartburn: No  arthralgias: Yes  joint swelling: No  peripheral edema: No  mood changes: No  myalgias: No  nausea: No  dysuria: No  palpitations: No  Skin sensation changes: No  sore throat: No  urgency: No  rash: No  shortness of breath: No  visual disturbance: No  weakness: No  impotence: Yes  Additional concerns today:: No  Duration of exercise:: 30-45 minutes

## 2022-12-06 NOTE — PROGRESS NOTES
SUBJECTIVE:   CC: Marquis is an 57 year old who presents for preventative health visit.   {Split Bill scripting  The purpose of this visit is to discuss your medical history and prevent health problems before you are sick. You may be responsible for a co-pay, coinsurance, or deductible if your visit today includes services such as checking on a sore throat, having an x-ray or lab test, or treating and evaluating a new or existing condition :328085}  Patient has been advised of split billing requirements and indicates understanding: Yes  Healthy Habits:     Getting at least 3 servings of Calcium per day:  Yes    Bi-annual eye exam:  Yes    Dental care twice a year:  Yes    Sleep apnea or symptoms of sleep apnea:  None    Diet:  Carbohydrate counting    Frequency of exercise:  2-3 days/week    Duration of exercise:  30-45 minutes    Taking medications regularly:  Yes    Medication side effects:  None    PHQ-2 Total Score: 0    Additional concerns today:  No             Today's PHQ-2 Score:   PHQ-2 ( 1999 Pfizer) 12/6/2022   Q1: Little interest or pleasure in doing things 0   Q2: Feeling down, depressed or hopeless 0   PHQ-2 Score 0   PHQ-2 Total Score (12-17 Years)- Positive if 3 or more points; Administer PHQ-A if positive -   Q1: Little interest or pleasure in doing things Not at all   Q2: Feeling down, depressed or hopeless Not at all   PHQ-2 Score 0       Have you ever done Advance Care Planning? (For example, a Health Directive, POLST, or a discussion with a medical provider or your loved ones about your wishes): No, advance care planning information given to patient to review.  Patient declined advance care planning discussion at this time.    Social History     Tobacco Use     Smoking status: Never     Smokeless tobacco: Never   Substance Use Topics     Alcohol use: Yes     Alcohol/week: 12.0 standard drinks     Types: 12 Cans of beer per week         Alcohol Use 12/6/2022   Prescreen: >3 drinks/day or >7  "drinks/week? No   AUDIT SCORE  -       Last PSA:   Prostate Specific Antigen Screen   Date Value Ref Range Status   09/24/2021 4.30 (H) 0.00 - 3.50 ug/L Final       Reviewed orders with patient. Reviewed health maintenance and updated orders accordingly - { :356527::\"Yes\"}  {Chronicprobdata (optional):303002}    Reviewed and updated as needed this visit by clinical staff   Tobacco  Allergies  Meds              Reviewed and updated as needed this visit by Provider                 {HISTORY OPTIONS (Optional):065169}    Review of Systems   Constitutional: Negative for chills and fever.   HENT: Negative for congestion, ear pain, hearing loss and sore throat.    Eyes: Negative for pain and visual disturbance.   Respiratory: Negative for cough and shortness of breath.    Cardiovascular: Negative for chest pain, palpitations and peripheral edema.   Gastrointestinal: Negative for abdominal pain, constipation, diarrhea, heartburn, hematochezia and nausea.   Genitourinary: Positive for impotence. Negative for dysuria, frequency, genital sores, hematuria and urgency.   Musculoskeletal: Positive for arthralgias. Negative for joint swelling and myalgias.   Skin: Negative for rash.   Neurological: Negative for dizziness, weakness, headaches and paresthesias.   Psychiatric/Behavioral: Negative for mood changes. The patient is not nervous/anxious.      {MALE ROS (Optional):181902::\"CONSTITUTIONAL: NEGATIVE for fever, chills, change in weight\",\"INTEGUMENTARY/SKIN: NEGATIVE for worrisome rashes, moles or lesions\",\"EYES: NEGATIVE for vision changes or irritation\",\"ENT: NEGATIVE for ear, mouth and throat problems\",\"RESP: NEGATIVE for significant cough or SOB\",\"CV: NEGATIVE for chest pain, palpitations or peripheral edema\",\"GI: NEGATIVE for nausea, abdominal pain, heartburn, or change in bowel habits\",\" male: negative for dysuria, hematuria, decreased urinary stream, erectile dysfunction, urethral discharge\",\"MUSCULOSKELETAL: " "NEGATIVE for significant arthralgias or myalgia\",\"NEURO: NEGATIVE for weakness, dizziness or paresthesias\",\"PSYCHIATRIC: NEGATIVE for changes in mood or affect\"}    OBJECTIVE:   /82 (BP Location: Right arm, Patient Position: Sitting, Cuff Size: Adult Large)   Pulse 72   Temp 97.1  F (36.2  C) (Oral)   Ht 1.905 m (6' 3\")   Wt 118.8 kg (262 lb)   BMI 32.75 kg/m      Physical Exam  {Exam Choices (Optional):532902}    {Diagnostic Test Results (Optional):321508::\"Diagnostic Test Results:\",\"Labs reviewed in Epic\"}    ASSESSMENT/PLAN:   {Diag Picklist:676791}    Patient has been advised of split billing requirements and indicates understanding: Yes      COUNSELING:   {MALE COUNSELING MESSAGES:183107::\"Reviewed preventive health counseling, as reflected in patient instructions\"}      BMI:   Estimated body mass index is 32.75 kg/m  as calculated from the following:    Height as of this encounter: 1.905 m (6' 3\").    Weight as of this encounter: 118.8 kg (262 lb).   {Weight Management Plan needed for ACO:336908}      He reports that he has never smoked. He has never used smokeless tobacco.      {Counseling Resources  [place] Preventive Services Task Force  Cholesterol Screening  Health diet/nutrition  Pooled Cohorts Equation Calculator  USDA's MyPlate  ASA Prophylaxis  Lung CA Screening  Osteoporosis prevention/bone health :036349}  {Prostate Cancer Screening  Consider for men 55-69 per guidance from USPSTF :274188}    Rafael Perry MD  [facility]  "

## 2022-12-07 RX ORDER — SILDENAFIL 100 MG/1
TABLET, FILM COATED ORAL
Qty: 18 TABLET | Refills: 0 | OUTPATIENT
Start: 2022-12-07

## 2022-12-07 NOTE — TELEPHONE ENCOUNTER
Outpatient Medication Detail     Disp Refills Start End ABBY   sildenafil (VIAGRA) 100 MG tablet (Discontinued) 30 tablet 0 8/30/2022 9/27/2022 No   Sig: TAKE 1/4 TO 1/2 TABLET BY MOUTH DAILY AS NEEDED FOR ERECTILE DYSFUNCTION.   Patient not taking: No sig reported        Sent to pharmacy as: Sildenafil Citrate 100 MG Oral Tablet (VIAGRA)   Class: E-Prescribe   Reason for Discontinue: Therapy completed   Order: [id number]   E-Prescribing Status: Receipt confirmed by pharmacy (8/30/2022  7:07 PM CDT)   E-Cancel Status: Request approved by pharmacy (9/27/2022  9:01 AM CDT)       E-Cancel Status Note: Already Dispensed, Cancelling Remaining

## 2022-12-08 NOTE — TELEPHONE ENCOUNTER
Patient calling to request refill of the attached medication.  Stating he never asked for the Sildenafil to be discontinued.  He is no longer taking the Tadalafil, but would like refill of the Sildenafil.    Any questions, patient can be reached at [phone].

## 2022-12-09 RX ORDER — SILDENAFIL 100 MG/1
TABLET, FILM COATED ORAL
Qty: 30 TABLET | Refills: 11 | Status: SHIPPED | OUTPATIENT
Start: 2022-12-09

## 2022-12-09 NOTE — TELEPHONE ENCOUNTER
"From PCP- \"Okay for refill as requested.  Dispense #20 take 100 mg tablet of sildenafil as needed for daily.  With 11 refills.  Please call patient and pharmacy.\"  "

## 2022-12-17 ENCOUNTER — HEALTH MAINTENANCE LETTER (OUTPATIENT)
Age: 57
End: 2022-12-17

## 2023-01-15 DIAGNOSIS — I10 ESSENTIAL HYPERTENSION: ICD-10-CM

## 2023-01-16 RX ORDER — ROSUVASTATIN CALCIUM 10 MG/1
TABLET, COATED ORAL
Qty: 90 TABLET | Refills: 0 | OUTPATIENT
Start: 2023-01-16

## 2023-01-23 DIAGNOSIS — I10 ESSENTIAL HYPERTENSION: ICD-10-CM

## 2023-01-23 RX ORDER — ROSUVASTATIN CALCIUM 10 MG/1
TABLET, COATED ORAL
Qty: 90 TABLET | Refills: 0 | Status: SHIPPED | OUTPATIENT
Start: 2023-01-23 | End: 2023-04-25

## 2023-01-23 NOTE — TELEPHONE ENCOUNTER
[facility] pharmacy calling to follow up on the attached refill request.  Stating they never received script that was sent on 12/6/22.  Investigation into script shows that 12/6 Rx was sent to [facility].  This should have gone to [facility] on file.      Any questions, pharmacy can be reached at [phone].

## 2023-02-09 DIAGNOSIS — I10 ESSENTIAL HYPERTENSION: ICD-10-CM

## 2023-02-10 RX ORDER — LOSARTAN POTASSIUM AND HYDROCHLOROTHIAZIDE 25; 100 MG/1; MG/1
1 TABLET ORAL DAILY
Qty: 90 TABLET | Refills: 3 | Status: SHIPPED | OUTPATIENT
Start: 2023-02-10 | End: 2024-01-15

## 2023-02-10 NOTE — TELEPHONE ENCOUNTER
Need override reason for refill of Losartan for interactions    Barbara Oliveira RN on 2/10/2023 at 2:03 PM

## 2023-03-09 ENCOUNTER — TRANSFERRED RECORDS (OUTPATIENT)
Dept: HEALTH INFORMATION MANAGEMENT | Facility: CLINIC | Age: 58
End: 2023-03-09
Payer: COMMERCIAL

## 2023-03-17 ENCOUNTER — TRANSFERRED RECORDS (OUTPATIENT)
Dept: HEALTH INFORMATION MANAGEMENT | Facility: CLINIC | Age: 58
End: 2023-03-17

## 2023-04-10 ENCOUNTER — TRANSFERRED RECORDS (OUTPATIENT)
Dept: HEALTH INFORMATION MANAGEMENT | Facility: CLINIC | Age: 58
End: 2023-04-10
Payer: COMMERCIAL

## 2023-04-18 ENCOUNTER — VIRTUAL VISIT (OUTPATIENT)
Dept: INTERNAL MEDICINE | Facility: CLINIC | Age: 58
End: 2023-04-18
Payer: COMMERCIAL

## 2023-04-18 DIAGNOSIS — M54.12 BRACHIAL NEURITIS OR RADICULITIS: Primary | ICD-10-CM

## 2023-04-18 PROCEDURE — 99213 OFFICE O/P EST LOW 20 MIN: CPT | Mod: 93 | Performed by: INTERNAL MEDICINE

## 2023-04-18 NOTE — PROGRESS NOTES
Marquis Martínez is a 58 year oldwho is being evaluated via a billable telephone visit.       What phone number would you like to be contacted at? [phone]      Assessment & Plan  Arm pain left side.  Right-hand-dominant.  Neurologic consultation ordered and may ultimately require MRI scan.  10-day history of same palmar surface volar surface near the elbow not necessarily related to neck or head motion.  But worse when straightens his arm out not better perhaps getting worse symptoms no motor deficit primarily a painful dysesthesia.  No rash.     11 minutes spent on the date of the encounter doing chart review, patient visit and documentation  and I spoke with the patient on the phone directly 5 minutes.       Subjective   Neuropathic leg pain as described above neurology consultation ordered May ultimately require MRI scan but will defer to neurology.     Review of Systems   Denies chest pain or shortness of breath not worse with movement of head no associated rash med list reviewed reconciled in the chart.  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  Mayank kohler.       Rafael Perry MD

## 2023-04-24 DIAGNOSIS — I10 ESSENTIAL HYPERTENSION: ICD-10-CM

## 2023-04-25 RX ORDER — ROSUVASTATIN CALCIUM 10 MG/1
TABLET, COATED ORAL
Qty: 90 TABLET | Refills: 2 | Status: SHIPPED | OUTPATIENT
Start: 2023-04-25 | End: 2023-06-26

## 2023-04-25 RX ORDER — ROSUVASTATIN CALCIUM 10 MG/1
TABLET, COATED ORAL
Qty: 90 TABLET | Refills: 0 | Status: SHIPPED | OUTPATIENT
Start: 2023-04-25 | End: 2023-06-26

## 2023-04-25 NOTE — TELEPHONE ENCOUNTER
Patient calling to request the attached medication be expedited as he is out of meds at this time.

## 2023-04-25 NOTE — TELEPHONE ENCOUNTER
"Last Written Prescription Date:  1/23/23  Last Fill Quantity: 90,  # refills: 0   Last office visit provider:  4/18/23     Requested Prescriptions   Pending Prescriptions Disp Refills     rosuvastatin (CRESTOR) 10 MG tablet [Pharmacy Med Name: ROSUVASTATIN 10MG TABLETS] 90 tablet 0     Sig: TAKE 1 TABLET(10 MG) BY MOUTH DAILY       Statins Protocol Passed - 4/25/2023  2:35 PM        Passed - LDL on file in past 12 months     Recent Labs   Lab Test 12/06/22  0824   *             Passed - No abnormal creatine kinase in past 12 months     No lab results found.             Passed - Recent (12 mo) or future (30 days) visit within the authorizing provider's specialty     Patient has had an office visit with the authorizing provider or a provider within the authorizing providers department within the previous 12 mos or has a future within next 30 days. See \"Patient Info\" tab in inbasket, or \"Choose Columns\" in Meds & Orders section of the refill encounter.              Passed - Medication is active on med list        Passed - Patient is age 18 or older             Vidal Calvillo RN 04/25/23 2:35 PM  "

## 2023-06-05 ENCOUNTER — LAB (OUTPATIENT)
Dept: LAB | Facility: CLINIC | Age: 58
End: 2023-06-05
Payer: COMMERCIAL

## 2023-06-05 ENCOUNTER — OFFICE VISIT (OUTPATIENT)
Dept: NEUROLOGY | Facility: CLINIC | Age: 58
End: 2023-06-05
Attending: INTERNAL MEDICINE
Payer: COMMERCIAL

## 2023-06-05 VITALS — HEART RATE: 66 BPM | SYSTOLIC BLOOD PRESSURE: 128 MMHG | DIASTOLIC BLOOD PRESSURE: 87 MMHG

## 2023-06-05 DIAGNOSIS — G62.9 NEUROPATHY: ICD-10-CM

## 2023-06-05 DIAGNOSIS — M54.12 C7 RADICULOPATHY: Primary | ICD-10-CM

## 2023-06-05 LAB
HBA1C MFR BLD: 4.8 %
TOTAL PROTEIN SERUM FOR ELP: 6.3 G/DL (ref 6.4–8.3)
VIT B12 SERPL-MCNC: 315 PG/ML (ref 232–1245)

## 2023-06-05 PROCEDURE — 84165 PROTEIN E-PHORESIS SERUM: CPT | Mod: TC | Performed by: PATHOLOGY

## 2023-06-05 PROCEDURE — 84155 ASSAY OF PROTEIN SERUM: CPT

## 2023-06-05 PROCEDURE — 36415 COLL VENOUS BLD VENIPUNCTURE: CPT

## 2023-06-05 PROCEDURE — 99205 OFFICE O/P NEW HI 60 MIN: CPT | Performed by: PSYCHIATRY & NEUROLOGY

## 2023-06-05 PROCEDURE — 86334 IMMUNOFIX E-PHORESIS SERUM: CPT | Mod: 26

## 2023-06-05 PROCEDURE — 83036 HEMOGLOBIN GLYCOSYLATED A1C: CPT

## 2023-06-05 PROCEDURE — 86334 IMMUNOFIX E-PHORESIS SERUM: CPT | Performed by: PATHOLOGY

## 2023-06-05 PROCEDURE — 82607 VITAMIN B-12: CPT

## 2023-06-05 PROCEDURE — 84165 PROTEIN E-PHORESIS SERUM: CPT | Mod: 26

## 2023-06-05 NOTE — PATIENT INSTRUCTIONS
I do think you had a radiculopathy at the left c7 nerve root.  I am unsure if it would return and would ask you to obtain imaging and an EMG to define it further.  - MRI cervical spine w/wout  - EMG of the left arm    I also noted a slight issue of lack of vibration sensation in the feet on both sides.  This can be due to neuropathy that is generalized, and I would ask you to obtain a few labs to look for common reversible causes.  - B12, SPEP, Immunofixation, A1c.

## 2023-06-05 NOTE — PROGRESS NOTES
[facility] Neurology Consultation    Marquis Martínez MRN# [id number]   Age: 58 year old YOB: 1965     Requesting physician: Rafael Durand     Reason for Consultation: arm pain      History of Presenting Symptoms:   Marquis Martínez is a 58 year old male who presents today for evaluation of right hand dysesthesia.  The patient has seen 4/14/2023 in an ER for left hand/arm pain, described mostly along the antecubital fossa.  A week prior to presentation, he had a mole removed from his abdomen and was given antibiotics for an infected wound (doxycycline).  US of the arm revealed no DVT.  He was to broaden his abx coverage with cephalexin.  In 10/26/2021, the patient was seen with orthopedics for left wrist problems, indicating he would get wrist pain when extending thumb and extending wrist.  The pain was on the dorsal and radial side of the wrist.  It was thought he likely had De Quevain tenosynovitis given normal XR and 1st dorsal compartment being thicker and tender compared to right side.    Today, the patient woke up about two months ago and found that when he tried to put his left arm through his shirt he developed severe pain in the arm.  He points to his elbow to his arm-pit in regards to pain.  It was only on the posterior surface of the arm.  The pain was only present when he abducted his shoulder to about 90 degrees and then extended his arm.  This lasted about 2-3 weeks.  He has had prolonged left hand weakness in his left wrist, but he developed a new issue of weakness with  strength (transiently) following this new event (occurred once).      On a separate note, just yesterday he has noted that when he turn his head (rotates left or right) he can develop weakness in the opposite shoulder (pain with weakness).  He describes the weakness as difficulty with holding/elevating his shoulder (like when driving he would have to slump his shoulder).     The patient is planning  on having a shoulder replaceement on the right.  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       Social History:   No alcohol use/abuse history. No smoking history.      Medications:   Rosuvastatin  Losartan-hydrochlorothiazide     Physical Exam:   Vitals: /87 (BP Location: Right arm, Patient Position: Sitting)   Pulse 66    General: Seated comfortably in no acute distress.  HEENT: Neck supple with normal range of motion. No paracervical muscle tenderness or tightness. Spurling's negative b/l, neck flexion without pain or radiating pain down spine.  Optic discs sharp and vasculature normal on funduscopic exam.   Skin: No rashes  Neurologic:     Mental Status: Fully alert, attentive and oriented. Speech clear and fluent, no paraphasic errors.      Cranial Nerves: Visual fields intact. PERRL. EOMI with normal smooth pursuit. Facial sensation intact/symmetric. Facial movements symmetric. Hearing not formally tested but intact to conversation.      Motor: No tremors or other abnormal movements observed. Muscle tone normal throughout. No pronator drift. Normal/symmetric rapid finger tapping. Strength 5/5 throughout upper and lower extremities except for thumb abduction was 4/5 on the left.     Deep Tendon Reflexes: 2+/symmetric throughout upper extremities, but absent at achilles and patellar b/l. No clonus. Toes downgoing bilaterally.     Sensory: Intact/symmetric to light touch, pinprick, throughout upper and lower extremities. Vibration was reduced at toes compared to ankles b/l (patient continued to think vibration was present up to 10 seconds after the stimulus was stopped by examiner). Negative Romberg.      Coordination: Finger-nose-finger without dysmetria. Rapid alternating movements intact/symmetric with normal speed and rhythm.     Gait: Normal, steady casual gait. Able to walk tandem without difficulty.         Data: Pertinent prior to visit   Imaging:  None  pertinent    Laboratory:  12/6/2022 - TSH was normal. CBC was normal. CMP was normal.         Assessment and Plan:   Assessment:  C8, T1 radiculopathy, left    The patient's described pain region matches a C5, T1, T2 dermatome, and given his issues started abruptly following sleep, I suspect there was an anatomical compression of the T1 nearby root which led to the symptoms.  There is a lack of specified weakness to indicate a more defined localization, however I do note on exam today he has some findings that indicate a CTS is present on the left.  Overall, I am not suspicious for a radiculitis or plexitis given the acute onset, and lack of other associated symptoms seen with infections or autoimmune related disorders.  While his current symptoms are abated, I suspect he would have a high likelihood to have repeat bouts of pain given his other musculoskeletal related issues of the past, and think defining his etiology related to anatomical localization would be helpful to quicken treatment in the future should symptoms return.    On a separate issue, he also has some minor vibration sensation difficulties in his feet b/l in a distal symmetric pattern.  He has no other symptoms related to neuropathy, but I think looking for a possible reversible etiology could be done through serum testing.  If there were defined symptoms, then a consideration towards obtaining an EMG could be made.    Plan:  EMG left arm  MRI cervical spine w/wout contrast  B12, SPEP, Immunofixation, A1c    Follow up in Neurology clinic in 3 months, virtually, or should new concerns arise.    SISSY Yap D.O.   of Neurology    Total time today (62 min) in this patient encounter was spent on pre-charting, counseling and/or coordination of care.  The patient is in agreement with this plan and has no further questions.

## 2023-06-05 NOTE — NURSING NOTE
Chief Complaint   Patient presents with     Brachial neuritis      Per pt, pain on left arm is gone. Now having weakness and pain in the neck area          DIEGO Juarez on 6/5/2023 at 8:05 AM

## 2023-06-05 NOTE — LETTER
6/5/2023         RE: Marquis Martínez  [address] [place] [address]        Dear Colleague,    Thank you for referring your patient, Marquis Martínez, to the [facility] NEUROLOGY CLINIC [facility]. Please see a copy of my visit note below.    [facility] Neurology Consultation    Marquis Martínez MRN# [id number]   Age: 58 year old YOB: 1965     Requesting physician: Rafael Durand     Reason for Consultation: arm pain      History of Presenting Symptoms:   Marquis Martínez is a 58 year old male who presents today for evaluation of right hand dysesthesia.  The patient has seen 4/14/2023 in an ER for left hand/arm pain, described mostly along the antecubital fossa.  A week prior to presentation, he had a mole removed from his abdomen and was given antibiotics for an infected wound (doxycycline).  US of the arm revealed no DVT.  He was to broaden his abx coverage with cephalexin.  In 10/26/2021, the patient was seen with orthopedics for left wrist problems, indicating he would get wrist pain when extending thumb and extending wrist.  The pain was on the dorsal and radial side of the wrist.  It was thought he likely had De Quevain tenosynovitis given normal XR and 1st dorsal compartment being thicker and tender compared to right side.    Today, the patient woke up about two months ago and found that when he tried to put his left arm through his shirt he developed severe pain in the arm.  He points to his elbow to his arm-pit in regards to pain.  It was only on the posterior surface of the arm.  The pain was only present when he abducted his shoulder to about 90 degrees and then extended his arm.  This lasted about 2-3 weeks.  He has had prolonged left hand weakness in his left wrist, but he developed a new issue of weakness with  strength (transiently) following this new event (occurred once).      On a separate note, just yesterday he has noted that when he  turn his head (rotates left or right) he can develop weakness in the opposite shoulder (pain with weakness).  He describes the weakness as difficulty with holding/elevating his shoulder (like when driving he would have to slump his shoulder).     The patient is planning on having a shoulder replaceement on the right.  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       Social History:   No alcohol use/abuse history. No smoking history.      Medications:   Rosuvastatin  Losartan-hydrochlorothiazide     Physical Exam:   Vitals: /87 (BP Location: Right arm, Patient Position: Sitting)   Pulse 66    General: Seated comfortably in no acute distress.  HEENT: Neck supple with normal range of motion. No paracervical muscle tenderness or tightness. Spurling's negative b/l, neck flexion without pain or radiating pain down spine.  Optic discs sharp and vasculature normal on funduscopic exam.   Skin: No rashes  Neurologic:     Mental Status: Fully alert, attentive and oriented. Speech clear and fluent, no paraphasic errors.      Cranial Nerves: Visual fields intact. PERRL. EOMI with normal smooth pursuit. Facial sensation intact/symmetric. Facial movements symmetric. Hearing not formally tested but intact to conversation.      Motor: No tremors or other abnormal movements observed. Muscle tone normal throughout. No pronator drift. Normal/symmetric rapid finger tapping. Strength 5/5 throughout upper and lower extremities except for thumb abduction was 4/5 on the left.     Deep Tendon Reflexes: 2+/symmetric throughout upper extremities, but absent at achilles and patellar b/l. No clonus. Toes downgoing bilaterally.     Sensory: Intact/symmetric to light touch, pinprick, throughout upper and lower extremities. Vibration was reduced at toes compared to ankles b/l (patient continued to think vibration was present up to 10 seconds after the stimulus was stopped by examiner). Negative Romberg.      Coordination:  Finger-nose-finger without dysmetria. Rapid alternating movements intact/symmetric with normal speed and rhythm.     Gait: Normal, steady casual gait. Able to walk tandem without difficulty.         Data: Pertinent prior to visit   Imaging:  None pertinent    Laboratory:  12/6/2022 - TSH was normal. CBC was normal. CMP was normal.         Assessment and Plan:   Assessment:  C8, T1 radiculopathy, left    The patient's described pain region matches a C5, T1, T2 dermatome, and given his issues started abruptly following sleep, I suspect there was an anatomical compression of the T1 nearby root which led to the symptoms.  There is a lack of specified weakness to indicate a more defined localization, however I do note on exam today he has some findings that indicate a CTS is present on the left.  Overall, I am not suspicious for a radiculitis or plexitis given the acute onset, and lack of other associated symptoms seen with infections or autoimmune related disorders.  While his current symptoms are abated, I suspect he would have a high likelihood to have repeat bouts of pain given his other musculoskeletal related issues of the past, and think defining his etiology related to anatomical localization would be helpful to quicken treatment in the future should symptoms return.    On a separate issue, he also has some minor vibration sensation difficulties in his feet b/l in a distal symmetric pattern.  He has no other symptoms related to neuropathy, but I think looking for a possible reversible etiology could be done through serum testing.  If there were defined symptoms, then a consideration towards obtaining an EMG could be made.    Plan:  EMG left arm  MRI cervical spine w/wout contrast  B12, SPEP, Immunofixation, A1c    Follow up in Neurology clinic in 3 months, virtually, or should new concerns arise.    SISSY Yap D.O.   of Neurology    Total time today (62 min) in this patient encounter  was spent on pre-charting, counseling and/or coordination of care.  The patient is in agreement with this plan and has no further questions.        Again, thank you for allowing me to participate in the care of your patient.        Sincerely,        Roly Yap, DO

## 2023-06-06 LAB
ALBUMIN SERPL ELPH-MCNC: 4.1 G/DL (ref 3.7–5.1)
ALPHA1 GLOB SERPL ELPH-MCNC: 0.3 G/DL (ref 0.2–0.4)
ALPHA2 GLOB SERPL ELPH-MCNC: 0.6 G/DL (ref 0.5–0.9)
B-GLOBULIN SERPL ELPH-MCNC: 0.7 G/DL (ref 0.6–1)
GAMMA GLOB SERPL ELPH-MCNC: 0.7 G/DL (ref 0.7–1.6)
M PROTEIN SERPL ELPH-MCNC: 0 G/DL
PROT PATTERN SERPL ELPH-IMP: NORMAL
PROT PATTERN SERPL IFE-IMP: NORMAL

## 2023-06-15 ENCOUNTER — TELEPHONE (OUTPATIENT)
Dept: INTERNAL MEDICINE | Facility: CLINIC | Age: 58
End: 2023-06-15
Payer: COMMERCIAL

## 2023-06-15 NOTE — TELEPHONE ENCOUNTER
Patient calling to get a call back from the provider to discuss him losing 10 lbs since Jan, he told his chiropractor this      -He doesnt feel rambo eating, doesn't feel hungry     [phone] (Mobile) Preferred phone number

## 2023-06-15 NOTE — TELEPHONE ENCOUNTER
Called patient    He has some unexplained weight loss and just not as hungry. His chiropractor encouraged him to reach out. Scheduled for telephone appt.

## 2023-06-20 ENCOUNTER — VIRTUAL VISIT (OUTPATIENT)
Dept: INTERNAL MEDICINE | Facility: CLINIC | Age: 58
End: 2023-06-20
Payer: COMMERCIAL

## 2023-06-20 DIAGNOSIS — R63.0 ANOREXIA: Primary | ICD-10-CM

## 2023-06-20 PROCEDURE — 99213 OFFICE O/P EST LOW 20 MIN: CPT | Mod: 93 | Performed by: INTERNAL MEDICINE

## 2023-06-20 NOTE — PROGRESS NOTES
Marquis Martínez is a 58 year oldwho is being evaluated via a billable telephone visit.       What phone number would you like to be contacted at? [phone]      Assessment & Plan  Anorexia with recent loss of both parents within 2 months.  Offered comprehensive lab testing and an antidepressant antianxiety medication like citalopram patient declined he is going to watch it he feels less depressed of late.  His appetite is not what it should be but previously weighed 297 pounds now more ideally he weighs 245 pounds.  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     11 minutes spent on the date of the encounter doing chart review, patient visit and documentation  and I spoke with the patient directly on the phone 5 minutes.  We had a good discussion.       Subjective   Father told him he should weigh 220 pounds and drop some weight.  He reached a peak weight of 297 pounds over a number of months he is lost weight down to 245 pounds.  Wonders if it is related to depression his appetite not what it should be.  He has had no blood in the stool or vomiting or nausea.     Review of Systems   No blood in stool or urine denies chest pain shortness of breath.  Offered additional testing including comprehensive metabolic profile hemogram TSH lipid panel A1c and urinalysis if symptoms persist.  He wants to wait on doing the labs for now as he recently had comprehensive labs done with his neurologist all clear.  He also wished to defer on any antianxiety antidepressant medication namely citalopram that I discussed briefly with him.       Rafael Perry MD

## 2023-06-26 DIAGNOSIS — I10 ESSENTIAL HYPERTENSION: ICD-10-CM

## 2023-06-26 RX ORDER — ROSUVASTATIN CALCIUM 10 MG/1
10 TABLET, COATED ORAL DAILY
Qty: 90 TABLET | Refills: 1 | Status: SHIPPED | OUTPATIENT
Start: 2023-06-26 | End: 2024-01-03

## 2023-06-26 NOTE — TELEPHONE ENCOUNTER
General Call    Contacts       Type Contact Phone/Fax    06/26/2023 12:17 PM CDT Phone (Incoming) Marquis Martínez (Self) [phone] (M)        Reason for Call:  Early Refill Authorization    What are your questions or concerns:  Rosuvastatin (CRESOTR) 10 mg    Patient reports he accidentally spilled RX bottle and was not able to recover the medication.    Pharmacy advised patient it was too early for refill to contact clinic.    Date of last appointment with provider: 04/18/2023    Could we send this information to you in [employer] or would you prefer to receive a phone call?:   Patient would prefer a phone call   Okay to leave a detailed message?: Yes at Cell number on file:    Telephone Information:   Mobile [phone]   Mobile Not on file.

## 2023-07-11 ENCOUNTER — HOSPITAL ENCOUNTER (OUTPATIENT)
Dept: MRI IMAGING | Facility: CLINIC | Age: 58
Discharge: HOME | End: 2023-07-11
Attending: PSYCHIATRY & NEUROLOGY | Admitting: PSYCHIATRY & NEUROLOGY
Payer: COMMERCIAL

## 2023-07-11 DIAGNOSIS — M54.12 C7 RADICULOPATHY: ICD-10-CM

## 2023-07-11 PROCEDURE — 72141 MRI NECK SPINE W/O DYE: CPT

## 2023-07-24 ENCOUNTER — OFFICE VISIT (OUTPATIENT)
Dept: NEUROLOGY | Facility: CLINIC | Age: 58
End: 2023-07-24
Attending: PSYCHIATRY & NEUROLOGY
Payer: COMMERCIAL

## 2023-07-24 DIAGNOSIS — M54.12 C7 RADICULOPATHY: ICD-10-CM

## 2023-07-24 PROCEDURE — 95909 NRV CNDJ TST 5-6 STUDIES: CPT | Performed by: PSYCHIATRY & NEUROLOGY

## 2023-07-24 PROCEDURE — 95886 MUSC TEST DONE W/N TEST COMP: CPT | Mod: LT | Performed by: PSYCHIATRY & NEUROLOGY

## 2023-07-24 NOTE — PROGRESS NOTES
[facility]  Electrodiagnostic Laboratory                 Department of Neurology                                                                                                         Test Date:  2023    Patient: Marquis Martínez : 1965 Physician: Tariq Traylor MD   Sex: Male AGE: 58 year Ref Phys: Roly Yap MD   ID#: [id number]   Technician: Carlos Astorga     History and Examination:  58 year old with transient pain in medial left upper arm. Onset was in 2023, lasting for a few weeks. This study is being performed to investigate for radiculopathy vs focal neuropathy.     Techniques:  Motor and sensory conduction studies were done with surface recording electrodes. EMG was done with a concentric needle electrode.     Results:  Nerve conduction studies:  1. Left median-D2, ulnar-D5, and radial sensory responses are normal.   2. Left median-ulnar palmar interlatency difference is normal.   3. Left median-APB and ulnar-ADM motor responses are normal.   4. Left median-lumbrical vs ulnar-interosseous latency difference is normal.    Needle EMG:  No abnormal spontaneous activity was seen in the sampled muscles.   Large amplitude and/or long duration motor unit potentials (MUP) were seen in the left triceps, PT, FDI and FPL muscles.  Recruitment patterns were normal.    Interpretation:  This is an abnormal study. There is electrophysiologic evidence suggestive of a mild chronic left-sided cervical radiculopathy affecting the C7 and C8 nerve roots. The absence of active denervation changes argues against a recent nerve or nerve root injury. Clinical correlation is recommended.     Tariq Traylor MD  Department of Neurology        Nerve Conduction Studies  Motor Sites      Latency Amplitude Neg. Amp Diff Segment Distance Velocity Neg. Dur Neg Area Diff Temperature Comment   Site (ms) Norm (mV) Norm %  cm m/s Norm ms %  C    Left Median (APB) Motor   Wrist 3.7   < 4.4 6.1  > 5.0  Wrist-APB 8   5.5  32.6    Elbow 8.8 - 5.9  > 5.0 -3.3 Elbow-Wrist 25.8 51  > 48 5.3 -9.6 32.7    Left Median/Ulnar (Lumb-Dors Int II) Motor        Median (Lumb I)   Wrist 3.2 - 1.26 -  Wrist-Lumb I 10   6.9  32.7         Ulnar (Dorsal Int (manus))   Wrist 3.1 - 2.9 -  Wrist-Dorsal Int (manus) 10   6.2  32.8    Left Ulnar (ADM) Motor   Wrist 3.2  < 3.5 8.0  > 5.0  Wrist-ADM 8   5.4      Bel Elbow 7.8 - 7.1 - -11.3 Bel Elbow-Wrist 26 57  > 48 5.9 2.4     Abv Elbow 10.2 - 7.0 - -1.41 Abv Elbow-Bel Elbow 14.4 60  > 48 6.2 -1.56       Sensory Sites      Onset Lat Peak Lat Amp (O-P) Amp (P-P) Segment Distance Velocity Temperature Comment   Site ms ms  V Norm  V  cm m/s Norm  C    Left Median Sensory   Wrist-Dig II 2.4 3.1 16  > 10 24 Wrist-Dig II 14 58  > 48 32    Left Median (Ortho) Sensory   Palm-Wrist 1.40 1.93 47 - 57 Palm-Wrist 8 57 - 32.1    Left Median-Ulnar Palmar Sensory        Median   Palm-Wrist 1.40 1.93 47 - 57 Palm-Wrist 8 57 - 32.1         Ulnar   Palm-Wrist 1.45 2.1 8 - 7 Palm-Wrist 8 55 - 32.1    Left Radial Sensory   Forearm-Wrist 1.53 2.2 23  > 15 31 Forearm-Wrist 10 65 - 32.9    Left Ulnar Sensory   Wrist-Dig V 2.5 3.2 11  > 8 12 Wrist-Dig V 12.5 50  > 48 32    Left Ulnar (Ortho) Sensory   Palm-Wrist 1.45 2.1 8 - 7 Palm-Wrist 8 55 - 32.1      Inter-Nerve Comparisons     Nerve 1 Value 1 Nerve 2 Value 2 Parameter Result Normal   Sensory Sites   L Median Palm-Wrist 1.9 ms L Ulnar Palm-Wrist 2.1 ms Peak Lat Diff 0.17 ms <0.40       Electromyography     Side Muscle Ins Act Fibs/PSW Fasc HF Amp Dur Poly Recrt Int Pat   Left  Deltoid Nml None Nml 0 Nml Nml 0 Nml Nml   Left  Biceps Nml None Nml 0 Nml Nml 0 Nml Nml   Left  Triceps Nml None Nml 0 1+ Nml 1+ Nml Nml   Left  Pronator Teres Nml None Nml 0 1+ Nml 0 Nml Nml   Left  FDI Nml None Nml 0 1+ Nml 1+ Nml Nml   Left  FPL Nml None Nml 0 1+ Nml 0 Nml Nml   Left Cervical Parasp (Mid) Nml None Nml 0              NCS Waveforms:    Motor            Sensory

## 2023-07-24 NOTE — LETTER
2023       RE: Marquis Martínez   [address] [place] [address]     Dear Colleague,    Thank you for referring your patient, Marquis Martínez, to the [facility] EMG CLINIC [place] at [facility]. Please see a copy of my visit note below.                            [facility]  Electrodiagnostic Laboratory                 Department of Neurology                                                                                                         Test Date:  2023    Patient: Marquis Martínez : 1965 Physician: Tariq Traylor MD   Sex: Male AGE: 58 year Ref Phys: Roly Yap MD   ID#: [id number]   Technician: Carlos Astorga     History and Examination:  58 year old with transient pain in medial left upper arm. Onset was in 2023, lasting for a few weeks. This study is being performed to investigate for radiculopathy vs focal neuropathy.     Techniques:  Motor and sensory conduction studies were done with surface recording electrodes. EMG was done with a concentric needle electrode.     Results:  Nerve conduction studies:  1. Left median-D2, ulnar-D5, and radial sensory responses are normal.   2. Left median-ulnar palmar interlatency difference is normal.   3. Left median-APB and ulnar-ADM motor responses are normal.   4. Left median-lumbrical vs ulnar-interosseous latency difference is normal.    Needle EMG:  No abnormal spontaneous activity was seen in the sampled muscles.   Large amplitude and/or long duration motor unit potentials (MUP) were seen in the left triceps, PT, FDI and FPL muscles.  Recruitment patterns were normal.    Interpretation:  This is an abnormal study. There is electrophysiologic evidence suggestive of a mild chronic left-sided cervical radiculopathy affecting the C7 and C8 nerve roots. The absence of active denervation changes argues against a recent nerve or nerve root injury. Clinical  correlation is recommended.     Tariq Traylor MD  Department of Neurology        Nerve Conduction Studies  Motor Sites      Latency Amplitude Neg. Amp Diff Segment Distance Velocity Neg. Dur Neg Area Diff Temperature Comment   Site (ms) Norm (mV) Norm %  cm m/s Norm ms %  C    Left Median (APB) Motor   Wrist 3.7  < 4.4 6.1  > 5.0  Wrist-APB 8   5.5  32.6    Elbow 8.8 - 5.9  > 5.0 -3.3 Elbow-Wrist 25.8 51  > 48 5.3 -9.6 32.7    Left Median/Ulnar (Lumb-Dors Int II) Motor        Median (Lumb I)   Wrist 3.2 - 1.26 -  Wrist-Lumb I 10   6.9  32.7         Ulnar (Dorsal Int (manus))   Wrist 3.1 - 2.9 -  Wrist-Dorsal Int (manus) 10   6.2  32.8    Left Ulnar (ADM) Motor   Wrist 3.2  < 3.5 8.0  > 5.0  Wrist-ADM 8   5.4      Bel Elbow 7.8 - 7.1 - -11.3 Bel Elbow-Wrist 26 57  > 48 5.9 2.4     Abv Elbow 10.2 - 7.0 - -1.41 Abv Elbow-Bel Elbow 14.4 60  > 48 6.2 -1.56       Sensory Sites      Onset Lat Peak Lat Amp (O-P) Amp (P-P) Segment Distance Velocity Temperature Comment   Site ms ms  V Norm  V  cm m/s Norm  C    Left Median Sensory   Wrist-Dig II 2.4 3.1 16  > 10 24 Wrist-Dig II 14 58  > 48 32    Left Median (Ortho) Sensory   Palm-Wrist 1.40 1.93 47 - 57 Palm-Wrist 8 57 - 32.1    Left Median-Ulnar Palmar Sensory        Median   Palm-Wrist 1.40 1.93 47 - 57 Palm-Wrist 8 57 - 32.1         Ulnar   Palm-Wrist 1.45 2.1 8 - 7 Palm-Wrist 8 55 - 32.1    Left Radial Sensory   Forearm-Wrist 1.53 2.2 23  > 15 31 Forearm-Wrist 10 65 - 32.9    Left Ulnar Sensory   Wrist-Dig V 2.5 3.2 11  > 8 12 Wrist-Dig V 12.5 50  > 48 32    Left Ulnar (Ortho) Sensory   Palm-Wrist 1.45 2.1 8 - 7 Palm-Wrist 8 55 - 32.1      Inter-Nerve Comparisons     Nerve 1 Value 1 Nerve 2 Value 2 Parameter Result Normal   Sensory Sites   L Median Palm-Wrist 1.9 ms L Ulnar Palm-Wrist 2.1 ms Peak Lat Diff 0.17 ms <0.40       Electromyography     Side Muscle Ins Act Fibs/PSW Fasc HF Amp Dur Poly Recrt Int Pat   Left  Deltoid Nml None Nml 0 Nml Nml 0 Nml Nml   Left  Biceps  Nml None Nml 0 Nml Nml 0 Nml Nml   Left  Triceps Nml None Nml 0 1+ Nml 1+ Nml Nml   Left  Pronator Teres Nml None Nml 0 1+ Nml 0 Nml Nml   Left  FDI Nml None Nml 0 1+ Nml 1+ Nml Nml   Left  FPL Nml None Nml 0 1+ Nml 0 Nml Nml   Left Cervical Parasp (Mid) Nml None Nml 0              NCS Waveforms:    Motor           Sensory                                Again, thank you for allowing me to participate in the care of your patient.      Sincerely,    Tariq Traylor MD

## 2023-08-04 ENCOUNTER — VIRTUAL VISIT (OUTPATIENT)
Dept: NEUROLOGY | Facility: CLINIC | Age: 58
End: 2023-08-04
Payer: COMMERCIAL

## 2023-08-04 VITALS — WEIGHT: 240 LBS | HEIGHT: 75 IN | BODY MASS INDEX: 29.84 KG/M2

## 2023-08-04 DIAGNOSIS — M48.02 CERVICAL STENOSIS OF SPINAL CANAL: ICD-10-CM

## 2023-08-04 DIAGNOSIS — M54.12 CERVICAL RADICULOPATHY AT C8: Primary | ICD-10-CM

## 2023-08-04 PROCEDURE — 99213 OFFICE O/P EST LOW 20 MIN: CPT | Mod: VID | Performed by: PSYCHIATRY & NEUROLOGY

## 2023-08-04 ASSESSMENT — PAIN SCALES - GENERAL: PAINLEVEL: NO PAIN (0)

## 2023-08-04 NOTE — PROGRESS NOTES
Virtual Visit Details    Type of service:  Video Visit     Originating Location (pt. Location): Home    Distant Location (provider location):  On-site  Platform used for Video Visit: Shlomo

## 2023-08-04 NOTE — NURSING NOTE
Is the patient currently in the state of [place]? YES    Visit mode:VIDEO    If the visit is dropped, the patient can be reconnected by: VIDEO VISIT: Send to e-mail at: [email or web address]    Will anyone else be joining the visit? NO      How would you like to obtain your AVS? MyChart    Are changes needed to the allergy or medication list? NO    Reason for visit: Follow Up

## 2023-08-04 NOTE — PROGRESS NOTES
"[facility] Neurology Follow Up Visit    Marquis Martínez MRN# [id number]   Age: 58 year old YOB: 1965     Brief history of symptoms: The patient was initially seen in neurologic consultation on 6/5/2023 for evaluation of arm pain. Please see the comprehensive neurologic consultation notes from those dates in the Epic records for details.     Overall impression was for that of a C8-T1 radiculopathy.  He was to have an EMG of his left arm and MRI cervical spine.    Interval history:   - EMG 7/24/2023 was suggestive for a left sided C7 and 8 radiculopathy which is chronic.  - MRI cervical spine 7/11/2023 showed severe left neural foraminal stenosis at C6-7 and C7-T1    Today, the patient is not having any new symptoms. He does tell me that he forgot to mention that when he looks up or down (extending or flexion his neck) he finds that his neck \"locks\" and can be locally painful.  This happens occasionally and he can manually overcome the \"lock\".       Physical Exam:   General: Seated comfortably in no acute distress.  HEENT: Neck supple with normal range of motion.   Neurologic:     Mental Status: Fully alert, attentive and oriented. Speech clear and fluent, no paraphasic errors.     Cranial Nerves: EOMI with normal smooth pursuit. Facial movements symmetric. Hearing not formally tested but intact to conversation.  No dysarthria.     Motor: No tremors or other abnormal movements observed.          Assessment and Plan:   Assessment:  Chronic radiculopathy of the cervical spine  Cervical stenosis    The patient has some moderate to severe cervical spine narrowing along with neural foraminal narrowing at multiple levels and a correlating EMG showing old C7 and 8 radiculopathy.  Overall, given his one time event of weakness/sensory loss consistent with radiculopathy, I am not certain there is an active injury occurring. However, given his imaging showing the stenosis and his reports of locking up with neck " extension, as well as some intermittent weakness in his left hand, I would want him to see a neurosurgeon to discuss possible intervention and risks of intervention relating to improving his cervical stenosis and neuro foraminal stenosis.    Plan:  Neurosurgery referral    Follow up in Neurology clinic as needed should new concerns arise.    SISYS Yap D.O.   of Neurology    Total time today (30 min) in this patient encounter was spent on pre-charting, counseling and/or coordination of care.

## 2023-08-04 NOTE — PATIENT INSTRUCTIONS
Your imaging and EMG indicate that you have had nerve root injuries in the cervical spine on the left side, as well as a narrowed spinal canal.  These issues may not be leading to any current symptoms, but I do think they should be evaluated with a neurosurgeon given the suspected risk for developing further injury which may be severe.    Neurosurgery referral is placed for you today

## 2023-08-04 NOTE — LETTER
"8/4/2023       RE: Marquis Martínez  [address] [place] [address]       Dear Colleague,    Thank you for referring your patient, Marquis Martínez, to the [facility] NEUROLOGY CLINIC [place] at [facility]. Please see a copy of my visit note below.    [facility] Neurology Follow Up Visit    Marquis Martínez MRN# [id number]   Age: 58 year old YOB: 1965     Brief history of symptoms: The patient was initially seen in neurologic consultation on 6/5/2023 for evaluation of arm pain. Please see the comprehensive neurologic consultation notes from those dates in the Epic records for details.     Overall impression was for that of a C8-T1 radiculopathy.  He was to have an EMG of his left arm and MRI cervical spine.    Interval history:   - EMG 7/24/2023 was suggestive for a left sided C7 and 8 radiculopathy which is chronic.  - MRI cervical spine 7/11/2023 showed severe left neural foraminal stenosis at C6-7 and C7-T1    Today, the patient is not having any new symptoms. He does tell me that he forgot to mention that when he looks up or down (extending or flexion his neck) he finds that his neck \"locks\" and can be locally painful.  This happens occasionally and he can manually overcome the \"lock\".       Physical Exam:   General: Seated comfortably in no acute distress.  HEENT: Neck supple with normal range of motion.   Neurologic:     Mental Status: Fully alert, attentive and oriented. Speech clear and fluent, no paraphasic errors.     Cranial Nerves: EOMI with normal smooth pursuit. Facial movements symmetric. Hearing not formally tested but intact to conversation.  No dysarthria.     Motor: No tremors or other abnormal movements observed.          Assessment and Plan:   Assessment:  Chronic radiculopathy of the cervical spine  Cervical stenosis    The patient has some moderate to severe cervical spine narrowing along with neural foraminal narrowing at " multiple levels and a correlating EMG showing old C7 and 8 radiculopathy.  Overall, given his one time event of weakness/sensory loss consistent with radiculopathy, I am not certain there is an active injury occurring. However, given his imaging showing the stenosis and his reports of locking up with neck extension, as well as some intermittent weakness in his left hand, I would want him to see a neurosurgeon to discuss possible intervention and risks of intervention relating to improving his cervical stenosis and neuro foraminal stenosis.    Plan:  Neurosurgery referral    Follow up in Neurology clinic as needed should new concerns arise.    Total time today (30 min) in this patient encounter was spent on pre-charting, counseling and/or coordination of care.           Again, thank you for allowing me to participate in the care of your patient.      Sincerely,    Roly Yap, DO

## 2023-08-14 DIAGNOSIS — M54.12 CERVICAL RADICULOPATHY AT C8: Primary | ICD-10-CM

## 2023-08-14 DIAGNOSIS — M48.02 CERVICAL STENOSIS OF SPINAL CANAL: ICD-10-CM

## 2023-08-24 ENCOUNTER — TELEPHONE (OUTPATIENT)
Dept: INTERNAL MEDICINE | Facility: CLINIC | Age: 58
End: 2023-08-24
Payer: COMMERCIAL

## 2023-08-25 NOTE — TELEPHONE ENCOUNTER
SPINE PATIENTS - NEW PROTOCOL PREVISIT    RECORDS RECEIVED FROM: internal   REASON FOR VISIT: Cervical radiculopathy at C8    Date of Appt: 9/12/23   NOTES (FOR ALL VISITS) STATUS DETAILS   OFFICE NOTE from referring provider Internal Dr Yap @ [facility]:  8/4/23   EMG REPORT Internal [facility]:  7/24/23   MEDICATION LIST Internal    IMAGING  (FOR ALL VISITS)     MRI (HEAD, NECK, SPINE) Internal [facility]:  MRI Cervical Spine 7/11/23

## 2023-09-11 ENCOUNTER — OFFICE VISIT (OUTPATIENT)
Dept: INTERNAL MEDICINE | Facility: CLINIC | Age: 58
End: 2023-09-11
Payer: COMMERCIAL

## 2023-09-11 ENCOUNTER — TELEPHONE (OUTPATIENT)
Dept: NEUROSURGERY | Facility: CLINIC | Age: 58
End: 2023-09-11

## 2023-09-11 VITALS
HEIGHT: 75 IN | OXYGEN SATURATION: 96 % | RESPIRATION RATE: 16 BRPM | WEIGHT: 247.8 LBS | SYSTOLIC BLOOD PRESSURE: 141 MMHG | DIASTOLIC BLOOD PRESSURE: 84 MMHG | BODY MASS INDEX: 30.81 KG/M2 | HEART RATE: 64 BPM | TEMPERATURE: 98.8 F

## 2023-09-11 DIAGNOSIS — R63.4 WEIGHT LOSS: Primary | ICD-10-CM

## 2023-09-11 PROCEDURE — 99214 OFFICE O/P EST MOD 30 MIN: CPT | Performed by: INTERNAL MEDICINE

## 2023-09-11 ASSESSMENT — PAIN SCALES - GENERAL: PAINLEVEL: NO PAIN (0)

## 2023-09-11 NOTE — PROGRESS NOTES
"  {PROVIDER CHARTING PREFERENCE:951520}    Everardo Cho is a 58 year old, presenting for the following health issues:  Follow Up, Hyperlipidemia, and Hypertension        9/11/2023     3:49 PM   Additional Questions   Roomed by LINETTE Oliver   Accompanied by N/A       History of Present Illness       Hyperlipidemia:  He presents for follow up of hyperlipidemia.   He is taking medication to lower cholesterol. He is not having myalgia or other side effects to statin medications.    Hypertension: He presents for follow up of hypertension.  He does not check blood pressure  regularly outside of the clinic. Outpatient blood pressures have not been over 140/90. He does not follow a low salt diet.     He eats 2-3 servings of fruits and vegetables daily.He consumes 1 sweetened beverage(s) daily.He exercises with enough effort to increase his heart rate 30 to 60 minutes per day.  He exercises with enough effort to increase his heart rate 3 or less days per week.   He is taking medications regularly.       {SUPERLIST (Optional):080198}  {additonal problems for provider to add (Optional):441803}      Review of Systems   {ROS COMP (Optional):197431}      Objective    BP (!) 141/84 (BP Location: Right arm, Patient Position: Sitting, Cuff Size: Adult Large)   Pulse 64   Temp 98.8  F (37.1  C) (Oral)   Resp 16   Ht 1.897 m (6' 2.69\")   Wt 112.4 kg (247 lb 12.8 oz)   SpO2 96%   BMI 31.23 kg/m    Body mass index is 31.23 kg/m .  Physical Exam   {Exam List (Optional):744686}    {Diagnostic Test Results (Optional):411550}    {AMBULATORY ATTESTATION (Optional):635515}              "

## 2023-09-11 NOTE — PROGRESS NOTES
"Assessment/Plan:    Loss uncertain etiology.  Specifically 60 pounds since 2019.  Not anorectic but loss of appetite concerning to his wife.  No associated fever night sweats does not appear feel or appear ill.  Today check hemogram comprehensive metabolic profile sedimentation rate lipid panel TSH urinalysis PSA.  We had a good discussion.    25 minutes spent on the date of the encounter doing chart review, patient visit, and documentation     Subjective:  Marquis Martínez is a 58 year old male presents for the following health issues 60 pound weight loss in the last 4 years patient's wife is concerned the patient generally feels well and he does not appear ill he does not feel ill he does not eat breakfast anymore eats less for lunch and evening time not as hungry.  No associated fever or night sweats no palpable lymph nodes noted by patient and his appetite is less.  Skips breakfast as a lunch not anorectic and no associated chest pain or shortness of breath denies abdominal pain or bloating sensation.  Color is the same he feels better less and weight    ROS:  No blood in stool or urine no melena medication list reviewed reconciled denies shortness of breath.    Objective:  BP (!) 141/84 (BP Location: Right arm, Patient Position: Sitting, Cuff Size: Adult Large)   Pulse 64   Temp 98.8  F (37.1  C) (Oral)   Resp 16   Ht 1.897 m (6' 2.69\")   Wt 112.4 kg (247 lb 12.8 oz)   SpO2 96%   BMI 31.23 kg/m    Neck veins are nondistended there is no palpable lymphadenopathy appreciated lymph bearing areas no carotid bruits the chest is clear heart tones normal abdomen benign no liver spleen tip palpable no masses.  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  Neuromuscular tone is excellent.  He appears well athletic.  Not in acute distress or toxic he does not appear acutely or chronically ill nonicteric not sallow complexion is " good and neuromuscular tone is excellent.    Rafael Perry MD  Internal Medicine  Answers submitted by the patient for this visit:  Lipid Visit (Submitted on 9/11/2023)  Chief Complaint: Chronic problems general questions HPI Form  Are you regularly taking any medication or supplement to lower your cholesterol?: Yes  Are you having muscle aches or other side effects that you think could be caused by your cholesterol lowering medication?: No  Hypertension Visit (Submitted on 9/11/2023)  Chief Complaint: Chronic problems general questions HPI Form  Do you check your blood pressure regularly outside of the clinic?: No  Are your blood pressures ever more than 140 on the top number (systolic) OR more than 90 on the bottom number (diastolic)? (For example, greater than 140/90): No  Are you following a low salt diet?: No  General Questionnaire (Submitted on 9/11/2023)  Chief Complaint: Chronic problems general questions HPI Form  How many servings of fruits and vegetables do you eat daily?: 2-3  On average, how many sweetened beverages do you drink each day (Examples: soda, juice, sweet tea, etc.  Do NOT count diet or artificially sweetened beverages)?: 1  How many minutes a day do you exercise enough to make your heart beat faster?: 30 to 60  How many days a week do you exercise enough to make your heart beat faster?: 3 or less  How many days per week do you miss taking your medication?: 0

## 2023-09-12 ENCOUNTER — TELEPHONE (OUTPATIENT)
Dept: NEUROLOGY | Facility: CLINIC | Age: 58
End: 2023-09-12

## 2023-09-12 ENCOUNTER — PRE VISIT (OUTPATIENT)
Dept: NEUROSURGERY | Facility: CLINIC | Age: 58
End: 2023-09-12

## 2023-09-12 NOTE — TELEPHONE ENCOUNTER
Health Call Center    Phone Message    May a detailed message be left on voicemail: yes     Reason for Call: Symptoms or Concerns     If patient has red-flag symptoms, warm transfer to triage line    Current symptom or concern: weakiness with left wrist    Symptoms have been present for:  10 day(s)    Has patient previously been seen for this? Yes    By : Dr. Yap     Date: asap    Are there any new or worsening symptoms? Yes: Pt states that his left wrist has worsened and would like to speak with care team on medication that could help.    Please call Pt back at [phone] to advise.    Action Taken: Message routed to:  [facility] Neurology    Travel Screening: Not Applicable

## 2023-09-12 NOTE — TELEPHONE ENCOUNTER
Called pt and he stated today his left wrist has been painful all day. He states wrist pain has been getting worse over the last 7 days and it hurts to make a fist, hurts holding a glass of water, and left wrist is weak.  He experiences a dull pain at rest and pain level of 7-9/10 when trying to use left wrist.  Pt stated neurosurgeon appt was cancelled and rescheduled for Oct 10. Pt's chiropractor advised him to call and see if Dr. Yap could prescribe a steroid to help with inflammation.  Also, pt stated hard to sleep with his left shoulder numbness and tossing and turning all night.

## 2023-09-13 NOTE — TELEPHONE ENCOUNTER
Called pt and informed him Dr. Yap's note:    This should go to his PCP. I don't know if this relates to his cervical radiculopathy, and he doesn't have a known carpal tunnel syndrome given his EMG findings.   Pt verbally understood and will contact his primary care provider.

## 2023-09-19 ENCOUNTER — TELEPHONE (OUTPATIENT)
Dept: NEUROLOGY | Facility: CLINIC | Age: 58
End: 2023-09-19

## 2023-09-19 ENCOUNTER — LAB (OUTPATIENT)
Dept: LAB | Facility: CLINIC | Age: 58
End: 2023-09-19
Payer: COMMERCIAL

## 2023-09-19 DIAGNOSIS — R63.4 WEIGHT LOSS: ICD-10-CM

## 2023-09-19 LAB
ALBUMIN SERPL BCG-MCNC: 4.6 G/DL (ref 3.5–5.2)
ALBUMIN UR-MCNC: 30 MG/DL
ALP SERPL-CCNC: 64 U/L (ref 40–129)
ALT SERPL W P-5'-P-CCNC: 12 U/L (ref 0–70)
ANION GAP SERPL CALCULATED.3IONS-SCNC: 11 MMOL/L (ref 7–15)
APPEARANCE UR: CLEAR
AST SERPL W P-5'-P-CCNC: 22 U/L (ref 0–45)
BACTERIA #/AREA URNS HPF: ABNORMAL /HPF
BILIRUB SERPL-MCNC: 0.7 MG/DL
BILIRUB UR QL STRIP: NEGATIVE
BUN SERPL-MCNC: 12.9 MG/DL (ref 6–20)
CALCIUM SERPL-MCNC: 9.6 MG/DL (ref 8.6–10)
CHLORIDE SERPL-SCNC: 103 MMOL/L (ref 98–107)
CHOLEST SERPL-MCNC: 200 MG/DL
COLOR UR AUTO: YELLOW
CREAT SERPL-MCNC: 0.89 MG/DL (ref 0.67–1.17)
DEPRECATED HCO3 PLAS-SCNC: 23 MMOL/L (ref 22–29)
EGFRCR SERPLBLD CKD-EPI 2021: >90 ML/MIN/1.73M2
ERYTHROCYTE [DISTWIDTH] IN BLOOD BY AUTOMATED COUNT: 11.2 % (ref 10–15)
ERYTHROCYTE [SEDIMENTATION RATE] IN BLOOD BY WESTERGREN METHOD: 9 MM/HR (ref 0–20)
GLUCOSE SERPL-MCNC: 108 MG/DL (ref 70–99)
GLUCOSE UR STRIP-MCNC: NEGATIVE MG/DL
HBA1C MFR BLD: 5.3 % (ref 0–5.6)
HCT VFR BLD AUTO: 43 % (ref 40–53)
HDLC SERPL-MCNC: 46 MG/DL
HGB BLD-MCNC: 15.2 G/DL (ref 13.3–17.7)
HGB UR QL STRIP: NEGATIVE
HYALINE CASTS #/AREA URNS LPF: ABNORMAL /LPF
KETONES UR STRIP-MCNC: NEGATIVE MG/DL
LDLC SERPL CALC-MCNC: 108 MG/DL
LEUKOCYTE ESTERASE UR QL STRIP: NEGATIVE
MCH RBC QN AUTO: 32.5 PG (ref 26.5–33)
MCHC RBC AUTO-ENTMCNC: 35.3 G/DL (ref 31.5–36.5)
MCV RBC AUTO: 92 FL (ref 78–100)
MUCOUS THREADS #/AREA URNS LPF: PRESENT /LPF
NITRATE UR QL: NEGATIVE
NONHDLC SERPL-MCNC: 154 MG/DL
PH UR STRIP: 6 [PH] (ref 5–8)
PLATELET # BLD AUTO: 280 10E3/UL (ref 150–450)
POTASSIUM SERPL-SCNC: 3.6 MMOL/L (ref 3.4–5.3)
PROT SERPL-MCNC: 7.3 G/DL (ref 6.4–8.3)
PSA SERPL DL<=0.01 NG/ML-MCNC: <0.01 NG/ML (ref 0–3.5)
RBC # BLD AUTO: 4.67 10E6/UL (ref 4.4–5.9)
RBC #/AREA URNS AUTO: ABNORMAL /HPF
SODIUM SERPL-SCNC: 137 MMOL/L (ref 136–145)
SP GR UR STRIP: >=1.03 (ref 1–1.03)
SQUAMOUS #/AREA URNS AUTO: ABNORMAL /LPF
TRIGL SERPL-MCNC: 228 MG/DL
TSH SERPL DL<=0.005 MIU/L-ACNC: 1.24 UIU/ML (ref 0.3–4.2)
UROBILINOGEN UR STRIP-ACNC: 0.2 E.U./DL
WBC # BLD AUTO: 6.7 10E3/UL (ref 4–11)
WBC #/AREA URNS AUTO: ABNORMAL /HPF

## 2023-09-19 PROCEDURE — 80061 LIPID PANEL: CPT

## 2023-09-19 PROCEDURE — 36415 COLL VENOUS BLD VENIPUNCTURE: CPT

## 2023-09-19 PROCEDURE — 83036 HEMOGLOBIN GLYCOSYLATED A1C: CPT

## 2023-09-19 PROCEDURE — 85652 RBC SED RATE AUTOMATED: CPT

## 2023-09-19 PROCEDURE — 85027 COMPLETE CBC AUTOMATED: CPT

## 2023-09-19 PROCEDURE — G0103 PSA SCREENING: HCPCS

## 2023-09-19 PROCEDURE — 84443 ASSAY THYROID STIM HORMONE: CPT

## 2023-09-19 PROCEDURE — 81001 URINALYSIS AUTO W/SCOPE: CPT

## 2023-09-19 PROCEDURE — 80053 COMPREHEN METABOLIC PANEL: CPT

## 2023-09-19 NOTE — TELEPHONE ENCOUNTER
Health Call Center    Phone Message    May a detailed message be left on voicemail: yes     Reason for Call: Other: Pt is requesting a call back to discuss recommendations for neck and shoulder pain. Pt states that he was scheduled with Neurosurgery and had to reschedule his appt per clinic reasons. Pt does have another appt scheduled for 10/10/23 with Dr. Baltazar in neurosurgery but is requesting something to help with the constant discomfort that he has been experiencing.     Please follow up with patient to further advise at   # [phone] (Mobile)     Action Taken: Message routed to:  [facility] neurology    Travel Screening: Not Applicable

## 2023-10-10 ENCOUNTER — OFFICE VISIT (OUTPATIENT)
Dept: NEUROSURGERY | Facility: CLINIC | Age: 58
End: 2023-10-10
Attending: PSYCHIATRY & NEUROLOGY
Payer: COMMERCIAL

## 2023-10-10 VITALS
BODY MASS INDEX: 29.84 KG/M2 | SYSTOLIC BLOOD PRESSURE: 135 MMHG | WEIGHT: 240 LBS | OXYGEN SATURATION: 98 % | DIASTOLIC BLOOD PRESSURE: 81 MMHG | HEIGHT: 75 IN | HEART RATE: 60 BPM

## 2023-10-10 DIAGNOSIS — M54.12 CERVICAL RADICULOPATHY AT C8: ICD-10-CM

## 2023-10-10 DIAGNOSIS — M48.02 CERVICAL STENOSIS OF SPINAL CANAL: ICD-10-CM

## 2023-10-10 PROCEDURE — 99204 OFFICE O/P NEW MOD 45 MIN: CPT | Mod: GC | Performed by: NEUROLOGICAL SURGERY

## 2023-10-10 ASSESSMENT — PAIN SCALES - GENERAL: PAINLEVEL: NO PAIN (0)

## 2023-10-10 NOTE — PATIENT INSTRUCTIONS
Dr Baltazar recommended conservative management and entered a referral to Physical Medicine.   Our scheduling team will assist in scheduling an appointment.

## 2023-10-10 NOTE — NURSING NOTE
Chief Complaint   Patient presents with    Consult     Neck pain worse at night and with movement     Shaye Serna CMA at 8:37 AM on 10/10/2023.

## 2023-10-10 NOTE — LETTER
"10/10/2023       RE: Marquis Martínez  [address] [place] [address]     Dear Colleague,    Thank you for referring your patient, Marquis Martínez, to the [facility] [place] [facility]. Please see a copy of my visit note below.    10/10/2023  Neurosurgery Clinic Visit    Chief Complaint: Left arm weakness     History of present illness:  Marquis Martínez is a 58 year old male with a PMHx of HTN, presenting today for evaluation of the left arm weakness and some occasional dull pain. His symptoms started in April when he stretched his arm out to put his clothes on where he felt some sharp pain in his neck and shoulder going down the medial aspect of his left arm. The sharp pain has improved over time, but he still feels weak and having pain along his left shoulder and arm which prompted him to seek medical attention. He was seen by Dr. Yap in Neurology clinic, EMG and MRI C-spine were obtained, which showed mild chronic C7/8 radiculopathy and spinal stenosis at C6-7. He recently started physical therapy and had one session, which seemed to help so far. He has not had any injections to this date.      Physical exam:   /81   Pulse 60   Ht 1.905 m (6' 3\")   Wt 108.9 kg (240 lb)   SpO2 98%   BMI 30.00 kg/m    General: Awake and alert and in no acute distress.  Pulm: Breathing comfortably on room air  Motor: No pronator drift. Good muscle bulk throughout. 5 out of 5 strength in bilateral upper and lower extremities.  Sensation: Sensation grossly intact to light touch in all extremities.   Reflexes: 2+ reflexes bilateral biceps, brachioradialis, and patellar tendons. Ramirez's reflex negative. Bilateral clonus negative.     Imaging:  MRI Cervical Spine w/o contrast 7/11/2023  IMPRESSION:  1.  No convincing cord signal abnormality.  2.  At C3-C4, there is moderate-to-severe bilateral neural foraminal stenosis.  3.  At " C4-C5, there is severe bilateral neural foraminal stenosis.  4.  At C5-C6, there is moderate-to-severe bilateral neural foraminal stenosis.  5.  At C6-C7, there is severe spinal canal stenosis with flattening and deformity of the cord. There is moderate-to-severe right and severe left neural foraminal stenosis.  6.  At C7-T1, there is moderate-to-severe left neural foraminal stenosis.    Assessment:  # Cervical radiculopathy   58 year old male with a PMHx of HTN, presenting today for evaluation of the left arm weakness and some occasional dull pain. EMG and MRI C-spine were obtained. There is mild chronic radiculopathy at C7 and C8 on the left side and moderate cervical spinal stenosis at C6-7 level. Majority of his pain has improved in a few weeks after the event. He recently started physical therapy and had one session, which seemed to help so far. He has not had any injections to this date. Full strength throughout without sensory deficits. No hyperreflexia. No bowel/bladder incontinence or saddle anesthesia.     Plan:  - PMR referral  - Continue PT as able   - Patient may follow-up with neurosurgery as needed     Patient seen and discussed with Dr. Bernie Baltazar MD.  Approximately 30 minutes were spent in chart and image review, evaluation of the patient and assessment of next steps.    Tamar Gonzalez MD on 10/10/2023 at 8:59 AM  PGY-1, Department of Neurosurgery  [facility]    REVIEWED ASSOCIATED CLINICAL DATA AND HISTORY FOUND IN THE MEDICAL RECORD:  Past Medical History:   No past medical history on file.    Surgical History:   No past surgical history on file.    Social history:   Social History     Tobacco Use    Smoking status: Never    Smokeless tobacco: Never   Vaping Use    Vaping Use: Never used   Substance Use Topics    Alcohol use: Yes     Alcohol/week: 12.0 standard drinks of alcohol     Types: 12 Cans of beer per week    Drug use: No     Family history:   Family History    Problem Relation Age of Onset    Coronary Artery Disease Father      Medications:  Current Outpatient Medications   Medication    clindamycin (CLINDAMAX) 1 % external gel    losartan-hydrochlorothiazide (HYZAAR) 100-25 MG tablet    rosuvastatin (CRESTOR) 10 MG tablet    sildenafil (VIAGRA) 100 MG tablet     No current facility-administered medications for this visit.     Allergies:   Allergies   Allergen Reactions    Shrimp Rash    Amlodipine Unknown     ankle swelling      Lisinopril-Hydrochlorothiazide Unknown     decreased libido      Penicillins Swelling     Unknown reaction    Tetanus Vaccines And Toxoid [Tetanus Toxoids] Unknown   I have seen this patient with the resident and formulated a plan and agree with this note.  AMP      Again, thank you for allowing me to participate in the care of your patient.      Sincerely,    Bernie Baltazar MD

## 2023-10-10 NOTE — PROGRESS NOTES
"10/10/2023  Neurosurgery Clinic Visit    Chief Complaint: Left arm weakness     History of present illness:  Marquis Martínez is a 58 year old male with a PMHx of HTN, presenting today for evaluation of the left arm weakness and some occasional dull pain. His symptoms started in April when he stretched his arm out to put his clothes on where he felt some sharp pain in his neck and shoulder going down the medial aspect of his left arm. The sharp pain has improved over time, but he still feels weak and having pain along his left shoulder and arm which prompted him to seek medical attention. He was seen by Dr. Yap in Neurology clinic, EMG and MRI C-spine were obtained, which showed mild chronic C7/8 radiculopathy and spinal stenosis at C6-7. He recently started physical therapy and had one session, which seemed to help so far. He has not had any injections to this date.      Physical exam:   /81   Pulse 60   Ht 1.905 m (6' 3\")   Wt 108.9 kg (240 lb)   SpO2 98%   BMI 30.00 kg/m    General: Awake and alert and in no acute distress.  Pulm: Breathing comfortably on room air  Motor: No pronator drift. Good muscle bulk throughout. 5 out of 5 strength in bilateral upper and lower extremities.  Sensation: Sensation grossly intact to light touch in all extremities.   Reflexes: 2+ reflexes bilateral biceps, brachioradialis, and patellar tendons. Ramirez's reflex negative. Bilateral clonus negative.     Imaging:  MRI Cervical Spine w/o contrast 7/11/2023  IMPRESSION:  1.  No convincing cord signal abnormality.  2.  At C3-C4, there is moderate-to-severe bilateral neural foraminal stenosis.  3.  At C4-C5, there is severe bilateral neural foraminal stenosis.  4.  At C5-C6, there is moderate-to-severe bilateral neural foraminal stenosis.  5.  At C6-C7, there is severe spinal canal stenosis with flattening and deformity of the cord. There is moderate-to-severe right and severe left neural foraminal stenosis.  6.  At " C7-T1, there is moderate-to-severe left neural foraminal stenosis.    Assessment:  # Cervical radiculopathy   58 year old male with a PMHx of HTN, presenting today for evaluation of the left arm weakness and some occasional dull pain. EMG and MRI C-spine were obtained. There is mild chronic radiculopathy at C7 and C8 on the left side and moderate cervical spinal stenosis at C6-7 level. Majority of his pain has improved in a few weeks after the event. He recently started physical therapy and had one session, which seemed to help so far. He has not had any injections to this date. Full strength throughout without sensory deficits. No hyperreflexia. No bowel/bladder incontinence or saddle anesthesia.     Plan:  - PMR referral  - Continue PT as able   - Patient may follow-up with neurosurgery as needed     Patient seen and discussed with Dr. Bernie Baltazar MD.  Approximately 30 minutes were spent in chart and image review, evaluation of the patient and assessment of next steps.    Tamar Gonzalez MD on 10/10/2023 at 8:59 AM  PGY-1, Department of Neurosurgery  [facility]    REVIEWED ASSOCIATED CLINICAL DATA AND HISTORY FOUND IN THE MEDICAL RECORD:  Past Medical History:   No past medical history on file.    Surgical History:   No past surgical history on file.    Social history:   Social History     Tobacco Use    Smoking status: Never    Smokeless tobacco: Never   Vaping Use    Vaping Use: Never used   Substance Use Topics    Alcohol use: Yes     Alcohol/week: 12.0 standard drinks of alcohol     Types: 12 Cans of beer per week    Drug use: No     Family history:   Family History   Problem Relation Age of Onset    Coronary Artery Disease Father      Medications:  Current Outpatient Medications   Medication    clindamycin (CLINDAMAX) 1 % external gel    losartan-hydrochlorothiazide (HYZAAR) 100-25 MG tablet    rosuvastatin (CRESTOR) 10 MG tablet    sildenafil (VIAGRA) 100 MG tablet     No  current facility-administered medications for this visit.     Allergies:   Allergies   Allergen Reactions    Shrimp Rash    Amlodipine Unknown     ankle swelling      Lisinopril-Hydrochlorothiazide Unknown     decreased libido      Penicillins Swelling     Unknown reaction    Tetanus Vaccines And Toxoid [Tetanus Toxoids] Unknown   I have seen this patient with the resident and formulated a plan and agree with this note.  AMP

## 2023-10-13 ENCOUNTER — TELEPHONE (OUTPATIENT)
Dept: NEUROSURGERY | Facility: CLINIC | Age: 58
End: 2023-10-13
Payer: COMMERCIAL

## 2023-10-13 NOTE — TELEPHONE ENCOUNTER
Writer called patient to explain the referral to  PM&R. Patient expressed understanding.     Shanice Oleary LPN  Neurosurgery

## 2023-10-13 NOTE — TELEPHONE ENCOUNTER
[facility] Call Center    Phone Message    May a detailed message be left on voicemail: yes     Reason for Call: Pt is requesting a call back from Jamel Pemberton.  He does not understand why he was not referred for an actual injection.  He was told he was being referred for an injection and what he was referred for was a consultation with the spine clinic.  Please call Pt back to discuss.  Thanks.

## 2023-11-06 ENCOUNTER — TELEPHONE (OUTPATIENT)
Dept: ANESTHESIOLOGY | Facility: CLINIC | Age: 58
End: 2023-11-06
Payer: COMMERCIAL

## 2023-11-06 DIAGNOSIS — M54.12 CERVICAL RADICULOPATHY: Primary | ICD-10-CM

## 2023-11-06 RX ORDER — DIAZEPAM 5 MG
5-10 TABLET ORAL
Status: CANCELLED | OUTPATIENT
Start: 2023-11-06

## 2023-11-06 NOTE — TELEPHONE ENCOUNTER
RN spoke with patient per provider request. Writer informed if patient would like to proceed with a procedure then we can cancel the clinic appointment with Dr. Hubbard. Patient agreed to this plan and writer cancelled appointment. Writer sent update to Dr. Hubbard to place case request.    Lesli Schultz RNCC

## 2023-11-08 ENCOUNTER — TELEPHONE (OUTPATIENT)
Dept: ANESTHESIOLOGY | Facility: CLINIC | Age: 58
End: 2023-11-08
Payer: COMMERCIAL

## 2023-11-08 PROBLEM — M54.12 CERVICAL RADICULOPATHY: Status: ACTIVE | Noted: 2023-11-06

## 2023-11-08 NOTE — TELEPHONE ENCOUNTER
RN reviewed patient chart. Pre procedure instructions were reviewed with patient.    Lesli Schultz RNCC

## 2023-11-08 NOTE — TELEPHONE ENCOUNTER
Patient is scheduled for surgery with Dr. Hubbard    Spoke with: patient    Date of Surgery: 11/30/23    [place] [facility] [place]    Informed patient they will need an adult  yes    Additional comments: Patient is aware of date and time of the procedure.        Praveena Valle MA on 11/8/2023 at 9:41 AM

## 2023-11-21 ENCOUNTER — TRANSFERRED RECORDS (OUTPATIENT)
Dept: HEALTH INFORMATION MANAGEMENT | Facility: CLINIC | Age: 58
End: 2023-11-21
Payer: COMMERCIAL

## 2023-11-30 ENCOUNTER — HOSPITAL ENCOUNTER (OUTPATIENT)
Facility: AMBULATORY SURGERY CENTER | Age: 58
Discharge: HOME | End: 2023-11-30
Attending: ANESTHESIOLOGY | Admitting: ANESTHESIOLOGY
Payer: COMMERCIAL

## 2023-11-30 ENCOUNTER — ANCILLARY PROCEDURE (OUTPATIENT)
Dept: RADIOLOGY | Facility: AMBULATORY SURGERY CENTER | Age: 58
End: 2023-11-30
Attending: ANESTHESIOLOGY
Payer: COMMERCIAL

## 2023-11-30 VITALS
DIASTOLIC BLOOD PRESSURE: 88 MMHG | HEART RATE: 67 BPM | SYSTOLIC BLOOD PRESSURE: 125 MMHG | OXYGEN SATURATION: 100 % | RESPIRATION RATE: 16 BRPM

## 2023-11-30 DIAGNOSIS — M54.12 CERVICAL RADICULOPATHY: ICD-10-CM

## 2023-11-30 PROCEDURE — 62321 NJX INTERLAMINAR CRV/THRC: CPT

## 2023-11-30 RX ORDER — DIAZEPAM 5 MG
5-10 TABLET ORAL
Status: DISCONTINUED | OUTPATIENT
Start: 2023-11-30 | End: 2023-12-01 | Stop reason: HOSPADM

## 2023-11-30 RX ORDER — DEXAMETHASONE SODIUM PHOSPHATE 10 MG/ML
INJECTION INTRAMUSCULAR; INTRAVENOUS PRN
Status: DISCONTINUED | OUTPATIENT
Start: 2023-11-30 | End: 2023-11-30 | Stop reason: HOSPADM

## 2023-11-30 RX ORDER — IOPAMIDOL 408 MG/ML
INJECTION, SOLUTION INTRATHECAL PRN
Status: DISCONTINUED | OUTPATIENT
Start: 2023-11-30 | End: 2023-11-30 | Stop reason: HOSPADM

## 2023-11-30 RX ORDER — LIDOCAINE HYDROCHLORIDE 10 MG/ML
INJECTION, SOLUTION EPIDURAL; INFILTRATION; INTRACAUDAL; PERINEURAL PRN
Status: DISCONTINUED | OUTPATIENT
Start: 2023-11-30 | End: 2023-11-30 | Stop reason: HOSPADM

## 2023-11-30 NOTE — OP NOTE
Patient: Marquis Martínez Age: 58 year old   MRN: [id number] Attending: Dr. Hubbard     Date of Visit: November 30, 2023      PAIN MEDICINE CLINIC PROCEDURE NOTE    ATTENDING CLINICIAN:    Balaji Hubbard MD    Fellow: KIRK Zuleta     PREPROCEDURE DIAGNOSES:  1.  Cervical radiculopathy  2.  Chronic neck pain radiating to the upper extremity    POSTPROCEDURE DIAGNOSES:  1.  Cervical radiculopathy  2.  Chronic neck pain radiating to the upper extremity      PROCEDURE(S) PERFORMED:  1.  Interlaminar cervical epidural steroid injection (C7-T1)   2.  Fluoroscopic guidance for the above-named procedure(s)      ANESTHESIA:  Local.    BLOOD LOSS:  Minimal.    DRAINS AND SPECIMENS:  None.    COMPLICATIONS:  None.    INDICATIONS:  Marquis Martínez is a 58 year old male with a history of  chronic neck pain radiating to the upper extremity.      The patient stated that the patient was in their usual state of health and denied recent anticoagulant use or recent infections.  Therefore, the plan is for the above mentioned procedure.     Procedure Details:  The patient was met in the procedure room, where the patient was identified by name, medical record number and date of birth.  All of the patient s last minute questions were answered. Written informed consent was obtained and saved in the electronic medical record, after the risks, benefits, and alternatives were discussed with the patient.      A formal time-out procedure was performed, as per protocol, including patient name, title of procedure, and site of procedure, and all in the room concurred.  Routine monitors were applied.      The patient was placed in the prone position on the procedure room table.  All pressure points were checked and comfortably padded.  Routine monitors were placed.  Vital signs were stable.    A chlorhexidine prep was completed followed by sterile draping per standard procedure.     The AP fluoroscopic view was optimized for approach at  C7-T1  interspace.  The skin over the interspace was infiltrated with 4-5 mL of 1% Lidocaine using a 25 gauge, 1.5 inch needle.  A 22-gauge 3-1/2 inch Tuohy needle was advanced midline between C7-T1 interlaminar space using the loss of resistance technique with preservative free normal saline with fluoroscopic guidance. Mutiple AP, contralateral oblique, and lateral fluoroscopic images are taken as Tuohy needle was advanced to the epidural space. The epidural space was identified, without evidence of blood, cerebrospinal fluid, or parasthesia throughout. Needle tip placement within the epidural space was further confirmed with 1-2 mL of nonionic contrast agent, with the epidural space visualized in the AP, contralateral oblique, and lateral fluoroscopic view(s) with appropriate spread of the agent with fat vacuolization and no intravascular or intrathecal spread noted.  Next, 6 mL of a treatment solution containing 1 mL of preservative dexamethasone 10mg/ml, 1 mL 0.25% and 4 mL preservative free normal saline was administered. The needle was withdrawn.    Light pressure was held at the puncture site(s) to prevent ecchymosis and oozing.  The patient's skin was cleansed, and hemostasis was confirmed.  Band-aids were applied to the needle injection site(s).      Condition:    The patient remained awake and alert throughout the procedure.  The patient tolerated the procedure well and was monitored for approximately 15 minutes afterward in the post procedure area.  There were no immediate post procedure complications noted.  The patient was then discharged to home as per protocol.      Pre-procedure pain score: 7/10  Post-procedure pain score: 6/10

## 2023-12-22 ENCOUNTER — TRANSFERRED RECORDS (OUTPATIENT)
Dept: HEALTH INFORMATION MANAGEMENT | Facility: CLINIC | Age: 58
End: 2023-12-22
Payer: COMMERCIAL

## 2023-12-31 DIAGNOSIS — I10 ESSENTIAL HYPERTENSION: ICD-10-CM

## 2024-01-02 RX ORDER — LOSARTAN POTASSIUM AND HYDROCHLOROTHIAZIDE 25; 100 MG/1; MG/1
1 TABLET ORAL DAILY
Qty: 90 TABLET | Refills: 3 | OUTPATIENT
Start: 2024-01-02

## 2024-01-03 DIAGNOSIS — I10 ESSENTIAL HYPERTENSION: ICD-10-CM

## 2024-01-03 RX ORDER — ROSUVASTATIN CALCIUM 10 MG/1
10 TABLET, COATED ORAL DAILY
Qty: 90 TABLET | Refills: 0 | Status: SHIPPED | OUTPATIENT
Start: 2024-01-03 | End: 2024-07-16

## 2024-01-15 ENCOUNTER — HOSPITAL ENCOUNTER (OUTPATIENT)
Dept: GENERAL RADIOLOGY | Facility: HOSPITAL | Age: 59
Discharge: HOME | End: 2024-01-15
Attending: INTERNAL MEDICINE | Admitting: INTERNAL MEDICINE
Payer: COMMERCIAL

## 2024-01-15 ENCOUNTER — OFFICE VISIT (OUTPATIENT)
Dept: INTERNAL MEDICINE | Facility: CLINIC | Age: 59
End: 2024-01-15
Payer: COMMERCIAL

## 2024-01-15 VITALS
HEART RATE: 74 BPM | WEIGHT: 244.8 LBS | SYSTOLIC BLOOD PRESSURE: 140 MMHG | HEIGHT: 75 IN | BODY MASS INDEX: 30.44 KG/M2 | DIASTOLIC BLOOD PRESSURE: 78 MMHG | OXYGEN SATURATION: 99 % | RESPIRATION RATE: 17 BRPM | TEMPERATURE: 98.1 F

## 2024-01-15 DIAGNOSIS — C61 PRIMARY MALIGNANT NEOPLASM OF PROSTATE (H): ICD-10-CM

## 2024-01-15 DIAGNOSIS — J45.901 ASTHMATIC BRONCHITIS WITH ACUTE EXACERBATION, UNSPECIFIED ASTHMA SEVERITY, UNSPECIFIED WHETHER PERSISTENT: ICD-10-CM

## 2024-01-15 DIAGNOSIS — J45.901 ASTHMATIC BRONCHITIS WITH ACUTE EXACERBATION, UNSPECIFIED ASTHMA SEVERITY, UNSPECIFIED WHETHER PERSISTENT: Primary | ICD-10-CM

## 2024-01-15 DIAGNOSIS — I10 ESSENTIAL HYPERTENSION: ICD-10-CM

## 2024-01-15 PROCEDURE — 71046 X-RAY EXAM CHEST 2 VIEWS: CPT

## 2024-01-15 PROCEDURE — 99214 OFFICE O/P EST MOD 30 MIN: CPT | Performed by: INTERNAL MEDICINE

## 2024-01-15 RX ORDER — LOSARTAN POTASSIUM AND HYDROCHLOROTHIAZIDE 25; 100 MG/1; MG/1
1 TABLET ORAL DAILY
Qty: 90 TABLET | Refills: 3 | Status: SHIPPED | OUTPATIENT
Start: 2024-01-15 | End: 2024-10-02

## 2024-01-15 RX ORDER — PREDNISONE 20 MG/1
20 TABLET ORAL 2 TIMES DAILY
Qty: 10 TABLET | Refills: 1 | Status: SHIPPED | OUTPATIENT
Start: 2024-01-15

## 2024-01-15 RX ORDER — CODEINE PHOSPHATE AND GUAIFENESIN 10; 100 MG/5ML; MG/5ML
1-2 SOLUTION ORAL EVERY 4 HOURS PRN
Qty: 240 ML | Refills: 1 | Status: SHIPPED | OUTPATIENT
Start: 2024-01-15

## 2024-01-15 RX ORDER — AZITHROMYCIN 250 MG/1
TABLET, FILM COATED ORAL
Qty: 6 TABLET | Refills: 1 | Status: SHIPPED | OUTPATIENT
Start: 2024-01-15 | End: 2024-01-20

## 2024-01-15 ASSESSMENT — PAIN SCALES - GENERAL: PAINLEVEL: NO PAIN (0)

## 2024-01-15 NOTE — PROGRESS NOTES
"Assessment/Plan:    Asthmatic bronchitis.  COVID testing at home twice negative.  No prior history of asthma.  Try Z-Allan 1 refill plus prednisone 20 mg twice daily for 5 days to take concurrently with the azithromycin for 5 days plus Cheratussin with codeine cough syrup 240 cc 1 to 2 teaspoons 4 times a day as needed for cough also check chest x-ray today.    25 minutes spent on the date of the encounter doing chart review, review of test results, interpretation of tests, patient visit, and documentation     Subjective:  Marquis Martínez is a 58 year old male presents for the following health issues sick for 5 to 7 days COVID testing done x 2 negative.  Prior history of COVID.  Cough that he just cannot get rid of with a low-grade temperature or fever.  Chest x-ray is pending.  He is allergic to penicillin amlodipine and tetanus plus lisinopril HCTZ.  The patient's cough is productive he denies hemoptysis no weight loss non-smoker.  In the remote past he has had COVID-19 illness.  As did most of his family.  Vaccine status is up-to-date.    ROS:  Denies blood in the stool urine or sputum medication list reviewed reconciled.    Objective:  BP (!) 140/78 (BP Location: Right arm, Patient Position: Sitting, Cuff Size: Adult Regular)   Pulse 74   Temp 98.1  F (36.7  C) (Oral)   Resp 17   Ht 1.905 m (6' 3\")   Wt 111 kg (244 lb 12.8 oz)   SpO2 99%   BMI 30.60 kg/m    Inspiratory rhonchi are heard along with expiratory rhonchi they are simple in nature.  No rales heart tones normal abdomen obese extremities free of edema neck veins nondistended no thyromegaly or scleral icterus.  Former [place] [employer]   and previously in the remote past has been treated for COVID.  Vaccine status reviewed.  Up-to-date.  We had a good discussion.    Rafael Perry MD  Internal Medicine    Answers submitted by the patient for this visit:  General Questionnaire (Submitted on 1/15/2024)  Chief Complaint: " Chronic problems general questions HPI Form  How many servings of fruits and vegetables do you eat daily?: 2-3  On average, how many sweetened beverages do you drink each day (Examples: soda, juice, sweet tea, etc.  Do NOT count diet or artificially sweetened beverages)?: 3  How many minutes a day do you exercise enough to make your heart beat faster?: 10 to 19  How many days a week do you exercise enough to make your heart beat faster?: 4  How many days per week do you miss taking your medication?: 0  General Concern (Submitted on 1/15/2024)  Chief Complaint: Chronic problems general questions HPI Form  What is the reason for your visit today?: Respiratory infection  When did your symptoms begin?: 3-7 days ago  What are your symptoms?: Intense coughing, abdomin is sore from all of the coughing  How would you describe these symptoms?: Severe  Are your symptoms:: Staying the same  Have you had these symptoms before?: No  Is there anything that makes you feel worse?: Coughing  Is there anything that makes you feel better?: Resting

## 2024-01-16 ENCOUNTER — TRANSFERRED RECORDS (OUTPATIENT)
Dept: HEALTH INFORMATION MANAGEMENT | Facility: CLINIC | Age: 59
End: 2024-01-16
Payer: COMMERCIAL

## 2024-01-18 ENCOUNTER — VIRTUAL VISIT (OUTPATIENT)
Dept: INTERNAL MEDICINE | Facility: CLINIC | Age: 59
End: 2024-01-18
Payer: COMMERCIAL

## 2024-01-18 DIAGNOSIS — E78.5 HYPERLIPIDEMIA LDL GOAL <100: Primary | ICD-10-CM

## 2024-01-18 PROCEDURE — 99441 PR PHYSICIAN TELEPHONE EVALUATION 5-10 MIN: CPT | Mod: 93 | Performed by: INTERNAL MEDICINE

## 2024-01-18 NOTE — PROGRESS NOTES
Marquis Martínez is a 58 year oldwho is being evaluated via a billable telephone visit.       What phone number would you like to be contacted at? [phone]      Assessment & Plan  Mild hyperlipidemia with total cholesterol 200 LDL cholesterol 108 discussed importance of weight loss with caloric restriction regular exercise and avoidance of saturated fat trans fats in the diet.  Weight loss exercise will help.    Mild hyperglycemia.  108.  A1c 5.3.  Discussed in detail again weight loss will help insulin resistance and improve blood sugar management.    Obesity latest BMI 31 discussed the importance of weight loss with caloric restriction regular exercise to improve lipid status blood pressure overall health and total cholesterol LDL cholesterol.  As well as mild hyperglycemia.  108 with recent comprehensive metabolic profile although A1c 5.3.  Reassurance given we had a good discussion.  11 minutes spent on the date of the encounter doing chart review, patient visit and documentation and I spoke with the patient on the phone directly 5 minutes.       Subjective  History as above generally feeling well.  Latest PSA not detectable.  Less than 0.1.  History of prostate cancer prostatectomy.  Followed at the [facility].     Review of Systems   No blood in the stool or urine denies chest pain shortness of breath medication list reviewed reconciled in the chart we had a good discussion.       Rafael Perry MD  Answers submitted by the patient for this visit:  General Questionnaire (Submitted on 1/16/2024)  Chief Complaint: Chronic problems general questions HPI Form  How many servings of fruits and vegetables do you eat daily?: 2-3  On average, how many sweetened beverages do you drink each day (Examples: soda, juice, sweet tea, etc.  Do NOT count diet or artificially sweetened beverages)?: 3  How many minutes a day do you exercise enough to make your heart beat faster?: 10 to 19  How many  days a week do you exercise enough to make your heart beat faster?: 4  How many days per week do you miss taking your medication?: 0  General Concern (Submitted on 1/16/2024)  Chief Complaint: Chronic problems general questions HPI Form  What is the reason for your visit today?: Respiratory infection  When did your symptoms begin?: 3-7 days ago  What are your symptoms?: Intense coughing, abdomin is sore from all of the coughing  How would you describe these symptoms?: Severe  Are your symptoms:: Staying the same  Have you had these symptoms before?: No  Is there anything that makes you feel worse?: Coughing  Is there anything that makes you feel better?: Resting

## 2024-01-27 ENCOUNTER — HEALTH MAINTENANCE LETTER (OUTPATIENT)
Age: 59
End: 2024-01-27

## 2024-03-18 ENCOUNTER — PATIENT OUTREACH (OUTPATIENT)
Dept: NEUROSURGERY | Facility: CLINIC | Age: 59
End: 2024-03-18
Payer: COMMERCIAL

## 2024-03-18 NOTE — PROGRESS NOTES
Phone call to patient in response to telephone message requesting injection orders be sent to [facility].      Passed on we have no current injection orders from Dr Baltazar. patient was last seen in Pain Clinic by Dr Hubbard.   Completed Cervical SAMANTHA. 11/30/23.      Patient was referred to Physical Medicine for non-operative management of his neck pain.  Patient was offered an SAMANTHA without seeing PM&R Med Spine.   Now looking for additional follow-up.      Will contact Pain Management Team for guidance.  Patient verbalized understanding/ agreement with current plan..

## 2024-03-21 ENCOUNTER — TRANSFERRED RECORDS (OUTPATIENT)
Dept: HEALTH INFORMATION MANAGEMENT | Facility: CLINIC | Age: 59
End: 2024-03-21
Payer: COMMERCIAL

## 2024-03-22 ENCOUNTER — TRANSFERRED RECORDS (OUTPATIENT)
Dept: HEALTH INFORMATION MANAGEMENT | Facility: CLINIC | Age: 59
End: 2024-03-22
Payer: COMMERCIAL

## 2024-04-02 DIAGNOSIS — M54.12 CERVICAL RADICULOPATHY AT C8: Primary | ICD-10-CM

## 2024-04-05 ENCOUNTER — TRANSFERRED RECORDS (OUTPATIENT)
Dept: HEALTH INFORMATION MANAGEMENT | Facility: CLINIC | Age: 59
End: 2024-04-05
Payer: COMMERCIAL

## 2024-04-29 ENCOUNTER — TRANSFERRED RECORDS (OUTPATIENT)
Dept: HEALTH INFORMATION MANAGEMENT | Facility: CLINIC | Age: 59
End: 2024-04-29
Payer: COMMERCIAL

## 2024-06-24 ENCOUNTER — TRANSFERRED RECORDS (OUTPATIENT)
Dept: HEALTH INFORMATION MANAGEMENT | Facility: CLINIC | Age: 59
End: 2024-06-24
Payer: COMMERCIAL

## 2024-07-16 DIAGNOSIS — I10 ESSENTIAL HYPERTENSION: ICD-10-CM

## 2024-07-16 RX ORDER — ROSUVASTATIN CALCIUM 10 MG/1
10 TABLET, COATED ORAL DAILY
Qty: 90 TABLET | Refills: 0 | Status: SHIPPED | OUTPATIENT
Start: 2024-07-16 | End: 2024-10-01

## 2024-07-30 ENCOUNTER — TRANSFERRED RECORDS (OUTPATIENT)
Dept: HEALTH INFORMATION MANAGEMENT | Facility: CLINIC | Age: 59
End: 2024-07-30
Payer: COMMERCIAL

## 2024-08-22 ENCOUNTER — TRANSFERRED RECORDS (OUTPATIENT)
Dept: HEALTH INFORMATION MANAGEMENT | Facility: CLINIC | Age: 59
End: 2024-08-22
Payer: COMMERCIAL

## 2024-09-24 ENCOUNTER — OFFICE VISIT (OUTPATIENT)
Dept: INTERNAL MEDICINE | Facility: CLINIC | Age: 59
End: 2024-09-24
Payer: COMMERCIAL

## 2024-09-24 ENCOUNTER — TELEPHONE (OUTPATIENT)
Dept: INTERNAL MEDICINE | Facility: CLINIC | Age: 59
End: 2024-09-24

## 2024-09-24 VITALS
SYSTOLIC BLOOD PRESSURE: 128 MMHG | HEART RATE: 61 BPM | OXYGEN SATURATION: 99 % | TEMPERATURE: 98.1 F | WEIGHT: 250.2 LBS | BODY MASS INDEX: 31.27 KG/M2 | DIASTOLIC BLOOD PRESSURE: 82 MMHG | RESPIRATION RATE: 18 BRPM

## 2024-09-24 DIAGNOSIS — R20.0 NUMBNESS OF FOOT: ICD-10-CM

## 2024-09-24 DIAGNOSIS — E78.5 HYPERLIPIDEMIA LDL GOAL <100: ICD-10-CM

## 2024-09-24 DIAGNOSIS — Z12.11 ENCOUNTER FOR COLONOSCOPY DUE TO HISTORY OF COLONIC POLYP: Primary | ICD-10-CM

## 2024-09-24 DIAGNOSIS — I10 ESSENTIAL HYPERTENSION: ICD-10-CM

## 2024-09-24 DIAGNOSIS — Z00.00 ROUTINE GENERAL MEDICAL EXAMINATION AT A HEALTH CARE FACILITY: Primary | ICD-10-CM

## 2024-09-24 DIAGNOSIS — Z86.0100 ENCOUNTER FOR COLONOSCOPY DUE TO HISTORY OF COLONIC POLYP: Primary | ICD-10-CM

## 2024-09-24 LAB
ALBUMIN SERPL BCG-MCNC: 4.5 G/DL (ref 3.5–5.2)
ALBUMIN UR-MCNC: NEGATIVE MG/DL
ALP SERPL-CCNC: 60 U/L (ref 40–150)
ALT SERPL W P-5'-P-CCNC: 14 U/L (ref 0–70)
ANION GAP SERPL CALCULATED.3IONS-SCNC: 11 MMOL/L (ref 7–15)
APPEARANCE UR: CLEAR
AST SERPL W P-5'-P-CCNC: 19 U/L (ref 0–45)
BILIRUB SERPL-MCNC: 0.6 MG/DL
BILIRUB UR QL STRIP: NEGATIVE
BUN SERPL-MCNC: 14.9 MG/DL (ref 8–23)
CALCIUM SERPL-MCNC: 9.6 MG/DL (ref 8.8–10.4)
CHLORIDE SERPL-SCNC: 103 MMOL/L (ref 98–107)
CHOLEST SERPL-MCNC: 172 MG/DL
COLOR UR AUTO: YELLOW
CREAT SERPL-MCNC: 0.96 MG/DL (ref 0.67–1.17)
EGFRCR SERPLBLD CKD-EPI 2021: >90 ML/MIN/1.73M2
ERYTHROCYTE [DISTWIDTH] IN BLOOD BY AUTOMATED COUNT: 11.7 % (ref 10–15)
FASTING STATUS PATIENT QL REPORTED: ABNORMAL
FASTING STATUS PATIENT QL REPORTED: ABNORMAL
GLUCOSE SERPL-MCNC: 109 MG/DL (ref 70–99)
GLUCOSE UR STRIP-MCNC: NEGATIVE MG/DL
HCO3 SERPL-SCNC: 27 MMOL/L (ref 22–29)
HCT VFR BLD AUTO: 42.6 % (ref 40–53)
HDLC SERPL-MCNC: 39 MG/DL
HGB BLD-MCNC: 14.4 G/DL (ref 13.3–17.7)
HGB UR QL STRIP: NEGATIVE
KETONES UR STRIP-MCNC: NEGATIVE MG/DL
LDLC SERPL CALC-MCNC: 70 MG/DL
LEUKOCYTE ESTERASE UR QL STRIP: NEGATIVE
MCH RBC QN AUTO: 31.4 PG (ref 26.5–33)
MCHC RBC AUTO-ENTMCNC: 33.8 G/DL (ref 31.5–36.5)
MCV RBC AUTO: 93 FL (ref 78–100)
NITRATE UR QL: NEGATIVE
NONHDLC SERPL-MCNC: 133 MG/DL
PH UR STRIP: 6 [PH] (ref 5–8)
PLATELET # BLD AUTO: 267 10E3/UL (ref 150–450)
POTASSIUM SERPL-SCNC: 4.7 MMOL/L (ref 3.4–5.3)
PROT SERPL-MCNC: 6.9 G/DL (ref 6.4–8.3)
PSA SERPL DL<=0.01 NG/ML-MCNC: <0.01 NG/ML (ref 0–3.5)
RBC # BLD AUTO: 4.58 10E6/UL (ref 4.4–5.9)
SODIUM SERPL-SCNC: 141 MMOL/L (ref 135–145)
SP GR UR STRIP: 1.02 (ref 1–1.03)
TRIGL SERPL-MCNC: 316 MG/DL
TSH SERPL DL<=0.005 MIU/L-ACNC: 2.42 UIU/ML (ref 0.3–4.2)
UROBILINOGEN UR STRIP-ACNC: 0.2 E.U./DL
WBC # BLD AUTO: 6.4 10E3/UL (ref 4–11)

## 2024-09-24 PROCEDURE — 85027 COMPLETE CBC AUTOMATED: CPT | Performed by: INTERNAL MEDICINE

## 2024-09-24 PROCEDURE — 36415 COLL VENOUS BLD VENIPUNCTURE: CPT | Performed by: INTERNAL MEDICINE

## 2024-09-24 PROCEDURE — 99396 PREV VISIT EST AGE 40-64: CPT | Performed by: INTERNAL MEDICINE

## 2024-09-24 PROCEDURE — 80061 LIPID PANEL: CPT | Performed by: INTERNAL MEDICINE

## 2024-09-24 PROCEDURE — 99213 OFFICE O/P EST LOW 20 MIN: CPT | Mod: 25 | Performed by: INTERNAL MEDICINE

## 2024-09-24 PROCEDURE — G0103 PSA SCREENING: HCPCS | Performed by: INTERNAL MEDICINE

## 2024-09-24 PROCEDURE — 84443 ASSAY THYROID STIM HORMONE: CPT | Performed by: INTERNAL MEDICINE

## 2024-09-24 PROCEDURE — 81003 URINALYSIS AUTO W/O SCOPE: CPT | Performed by: INTERNAL MEDICINE

## 2024-09-24 PROCEDURE — 80053 COMPREHEN METABOLIC PANEL: CPT | Performed by: INTERNAL MEDICINE

## 2024-09-24 ASSESSMENT — ASTHMA QUESTIONNAIRES
QUESTION_4 LAST FOUR WEEKS HOW OFTEN HAVE YOU USED YOUR RESCUE INHALER OR NEBULIZER MEDICATION (SUCH AS ALBUTEROL): NOT AT ALL
ACT_TOTALSCORE: 24
ACT_TOTALSCORE: 24
QUESTION_2 LAST FOUR WEEKS HOW OFTEN HAVE YOU HAD SHORTNESS OF BREATH: NOT AT ALL
QUESTION_1 LAST FOUR WEEKS HOW MUCH OF THE TIME DID YOUR ASTHMA KEEP YOU FROM GETTING AS MUCH DONE AT WORK, SCHOOL OR AT HOME: NONE OF THE TIME
QUESTION_5 LAST FOUR WEEKS HOW WOULD YOU RATE YOUR ASTHMA CONTROL: WELL CONTROLLED
QUESTION_3 LAST FOUR WEEKS HOW OFTEN DID YOUR ASTHMA SYMPTOMS (WHEEZING, COUGHING, SHORTNESS OF BREATH, CHEST TIGHTNESS OR PAIN) WAKE YOU UP AT NIGHT OR EARLIER THAN USUAL IN THE MORNING: NOT AT ALL

## 2024-09-24 NOTE — TELEPHONE ENCOUNTER
Order/Referral Request    Who is requesting: patient    Orders being requested: Colonoscopy for March and podiatry and also wants to know if he should get a shingles vaccine    Reason service is needed/diagnosis: due for colonoscopy in March and is having numbness in his foot that he wants to speak to podiatry about    When are orders needed by:     Has this been discussed with Provider: No    Does patient have a preference on a Group/Provider/Facility [facility]    Does patient have an appointment scheduled?: No    Where to send orders: Place orders within Epic    Could we send this information to you in [email or web address] or would you prefer to receive a phone call?:   Patient would prefer a phone call   Okay to leave a detailed message?: Yes at Cell number on file:    Telephone Information:   Mobile [phone]   Mobile Not on file.

## 2024-09-24 NOTE — PROGRESS NOTES
Office Visit - Physical    Marquis KILLIAN Martínez   59 year old male    Date of Visit: 9/24/2024    Chief Complaint   Patient presents with    Physical       Subjective: Physical examination.  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  Non-smoker 1 alcoholic drink beer per day allergy include penicillin lisinopril HCTZ and amlodipine.  Plus tetanus and shrimp.  No anesthetic complications anticipates ankle surgery arthroscopic with Dr. Kin CLARKE [facility] prior history of hypertension hyperlipidemia.  Prior history of prostate cancer resected history of cervical radiculopathy now off prednisone.  Asymptomatic.    ROS: A comprehensive review of systems was performed and was otherwise negative    Medications:   Prior to Admission medications    Medication Sig Start Date End Date Taking? Authorizing Provider   clindamycin (CLINDAMAX) 1 % external gel  6/30/22  Yes Reported, Patient   guaiFENesin-codeine (ROBITUSSIN AC) 100-10 MG/5ML solution Take 5-10 mLs by mouth every 4 hours as needed for cough 1/15/24  Yes Rafael Perry MD   losartan-hydrochlorothiazide (HYZAAR) 100-25 MG tablet Take 1 tablet by mouth daily 1/15/24  Yes Rafael Perry MD   predniSONE (DELTASONE) 20 MG tablet Take 1 tablet (20 mg) by mouth 2 times daily 1/15/24  Yes Rafael Perry MD   rosuvastatin (CRESTOR) 10 MG tablet TAKE 1 TABLET(10 MG) BY MOUTH DAILY 7/16/24  Yes Rafael Perry MD   sildenafil (VIAGRA) 100 MG tablet TAKE 1/4 TO 1/2 TABLET BY MOUTH DAILY AS NEEDED FOR ERECTILE DYSFUNCTION. Strength: 100 mg 12/9/22  Yes Rafael Perry MD       Allergies:  Allergies   Allergen Reactions    Shrimp Rash    Amlodipine Unknown     ankle swelling      Lisinopril-Hydrochlorothiazide Unknown     decreased libido      Penicillins Swelling     Unknown reaction    Tetanus Vaccines And Toxoid [Tetanus Toxoids] Unknown       Immunizations:   Immunization History   Administered Date(s) Administered    COVID-19 Bivalent  18+ (Moderna) 10/25/2022    COVID-19 Monovalent 18+ (Moderna) 03/10/2021, 2021, 2021    DT (PEDS <7y) 2004    Flu, Unspecified 2012    Influenza (IIV3) PF 2014    Influenza Vaccine 18-64 (Flublok) 2021, 10/25/2022    Influenza Vaccine >6 months,quad, PF 10/31/2019, 2020, 2023    Influenza Vaccine, 6+MO IM (QUADRIVALENT W/PRESERVATIVES) 2008, 2014, 09/10/2015, 2016    Influenza, seasonal, injectable, PF 2009    TDAP (Adacel,Boostrix) 2011, 01/15/2012, 2020    Td,adult,historic,unspecified 2004       Health Maintenance: Immunizations reviewed and up-to-date colonoscopy examination next due .  Vaccines reviewed up-to-date.    Past Medical History: Hypertension and hyperlipidemia plus obesity BMI 31+ discussed.    Past Surgical History: Upcoming arthroscopic surgery ankle with Dr. Sanderson hip [facility].  Prior prostatectomy for prostate cancer no sign of recurrence PSA level pending.    Family History: Mother  at 98 perforated gastric ulcer.  Nursing home.    Father  90 COVID illness.  Also had bladder cancer.  3 children well wife well.    Social History: Former [employer] [place] [employer] .  [employer]:.  Retired from sales.  Building a cabin.    Exam easily conversant good spirited appears younger than stated age.  Vital signs are stable.  128/82 afebrile.  Chest clear heart tones normal abdomen benign genital exam negative testicular exam normal scrotal contents normal no groin hernias extremities are free of edema cyanosis or clubbing skin lymph neuro negative psych normal no carotid bruits no thyromegaly no thyroid nodules.  No lymphadenopathy appreciated.  Area short cropped hair appears well.    Assessment and Plan  (Z00.00) Routine general medical examination at a health care facility  (primary encounter diagnosis)  Comment: General Health is excellent.  Plan: CBC with platelets,  Comprehensive metabolic         panel, Lipid panel reflex to direct LDL         Fasting, TSH, UA Macroscopic with reflex to         Microscopic and Culture, Prostate Specific         Antigen Screen        We had a good examination and a good discussion.    (I10) Essential hypertension  Comment: Pressure is well-controlled same meds and cares.  History of target organ damage related to same.  Plan: Same meds and cares reemphasized salt restriction regular exercise and the need to lose weight.    (E78.5) Hyperlipidemia LDL goal <100  Comment: Same medications primary prevention.  No history of MI or stroke.  Plan: Cares advised.  Weight loss will help lipid and blood pressure status.  Discussion had a good examination today.        The following high BMI interventions were performed this visit: encouragement to exercise    Rafael Perry MD    Patient Active Problem List   Diagnosis    Cervical radiculopathy    Primary malignant neoplasm of prostate (H)

## 2024-09-25 ENCOUNTER — PATIENT OUTREACH (OUTPATIENT)
Dept: CARE COORDINATION | Facility: CLINIC | Age: 59
End: 2024-09-25
Payer: COMMERCIAL

## 2024-09-25 ENCOUNTER — MYC MEDICAL ADVICE (OUTPATIENT)
Dept: INTERNAL MEDICINE | Facility: CLINIC | Age: 59
End: 2024-09-25
Payer: COMMERCIAL

## 2024-09-26 ENCOUNTER — PATIENT OUTREACH (OUTPATIENT)
Dept: GASTROENTEROLOGY | Facility: CLINIC | Age: 59
End: 2024-09-26
Payer: COMMERCIAL

## 2024-09-27 ENCOUNTER — PATIENT OUTREACH (OUTPATIENT)
Dept: CARE COORDINATION | Facility: CLINIC | Age: 59
End: 2024-09-27
Payer: COMMERCIAL

## 2024-09-30 DIAGNOSIS — I10 ESSENTIAL HYPERTENSION: ICD-10-CM

## 2024-10-01 RX ORDER — ROSUVASTATIN CALCIUM 10 MG/1
10 TABLET, COATED ORAL DAILY
Qty: 90 TABLET | Refills: 2 | Status: SHIPPED | OUTPATIENT
Start: 2024-10-01

## 2024-10-02 ENCOUNTER — TELEPHONE (OUTPATIENT)
Dept: INTERNAL MEDICINE | Facility: CLINIC | Age: 59
End: 2024-10-02
Payer: COMMERCIAL

## 2024-10-02 DIAGNOSIS — T14.8XXA WOUND INFECTION: ICD-10-CM

## 2024-10-02 DIAGNOSIS — I10 ESSENTIAL HYPERTENSION: ICD-10-CM

## 2024-10-02 DIAGNOSIS — L08.9 WOUND INFECTION: ICD-10-CM

## 2024-10-02 RX ORDER — LOSARTAN POTASSIUM AND HYDROCHLOROTHIAZIDE 25; 100 MG/1; MG/1
1 TABLET ORAL DAILY
Qty: 90 TABLET | Refills: 3 | Status: SHIPPED | OUTPATIENT
Start: 2024-10-02

## 2024-10-02 RX ORDER — CEPHALEXIN 500 MG/1
500 CAPSULE ORAL 4 TIMES DAILY
Qty: 28 CAPSULE | Refills: 1 | Status: SHIPPED | OUTPATIENT
Start: 2024-10-02 | End: 2024-10-31

## 2024-10-02 NOTE — TELEPHONE ENCOUNTER
New Medication Request    Contacts       Contact Date/Time Type Contact Phone/Fax    10/02/2024 10:32 AM CDT Phone (Incoming) Marquis Martínez (Self) [phone] (M)            What medication are you requesting?: Anti Biotic Keflex    Reason for medication request: [facility] had an appointment 09/24/24 with Dr. Perry and PCP said he would send RX for issue on his leg with 1 refill.   Patient is also requesting one year refill on Losartan pharmacy did not receive renewal.     Medication bin'd up for approval if appropriate.    Have you taken this medication before?: Yes: medication is listed in Medication History.    Controlled Substance Agreement on file:   CSA -- Patient Level:    CSA: None found at the patient level.         Patient offered an appointment? No Last office visit with PCP - 09/24/24    Preferred Pharmacy:    [id number] - [place], [place] - [address] AT [facility] [address]  [address]  [place] [address]  Phone: [phone] Fax: [phone]

## 2024-10-22 ENCOUNTER — TRANSFERRED RECORDS (OUTPATIENT)
Dept: HEALTH INFORMATION MANAGEMENT | Facility: CLINIC | Age: 59
End: 2024-10-22
Payer: COMMERCIAL

## 2024-10-24 ENCOUNTER — OFFICE VISIT (OUTPATIENT)
Dept: PODIATRY | Facility: CLINIC | Age: 59
End: 2024-10-24
Attending: INTERNAL MEDICINE
Payer: COMMERCIAL

## 2024-10-24 VITALS — HEART RATE: 67 BPM | WEIGHT: 250 LBS | OXYGEN SATURATION: 98 % | BODY MASS INDEX: 31.25 KG/M2

## 2024-10-24 DIAGNOSIS — M21.6X9 PLANTAR FLEXED METATARSAL, UNSPECIFIED LATERALITY: ICD-10-CM

## 2024-10-24 DIAGNOSIS — M20.41 HAMMERTOE OF SECOND TOE OF RIGHT FOOT: ICD-10-CM

## 2024-10-24 DIAGNOSIS — L85.3 DRY SKIN: ICD-10-CM

## 2024-10-24 DIAGNOSIS — M21.6X9 ACQUIRED CAVUS FOOT DEFORMITY: Primary | ICD-10-CM

## 2024-10-24 PROCEDURE — 99243 OFF/OP CNSLTJ NEW/EST LOW 30: CPT | Performed by: PODIATRIST

## 2024-10-24 RX ORDER — AMMONIUM LACTATE 12 G/100G
CREAM TOPICAL 2 TIMES DAILY
Qty: 385 G | Refills: 1 | Status: SHIPPED | OUTPATIENT
Start: 2024-10-24

## 2024-10-24 ASSESSMENT — PAIN SCALES - GENERAL: PAINLEVEL_OUTOF10: EXTREME PAIN (9)

## 2024-10-24 NOTE — PROGRESS NOTES
FOOT AND ANKLE SURGERY/PODIATRY CONSULT NOTE         ASSESSMENT:   Dry skin  Hammertoe bilaterally  Cavus foot deformity      TREATMENT:  I have recommended orthotics with a metatarsal raise.  The patient has been referred to Dr. Dejesus for hammertoe correction second toe right foot.  The patient was given a prescription for Lac-Hydrin to be applied daily.        HPI: I was asked to see Marquis Baileyoumou today to evaluate and treat bilateral foot pain.  The patient indicated that he has had severe pain in the ball of both feet for the past several months.  The patient stated he is now retired and since his penitentiary he has been standing and become more active.  While walking he was fairly sedentary.  He describes the pain as a aching type pain located on the ball of both feet which is relieved with nonweightbearing.  He denies any trauma to his feet.  He has not had any associated redness or swelling.  The pain is moderate to severe.  It does alter his gait at times.  He also complains of dry skin and calluses of both feet.  He denies any other previous treatment.  The patient was seen in consultation at the request of Rafael Agosto MD for evaluation of dry skin bilaterally and hammertoes..     History reviewed. No pertinent past medical history.    Social History     Socioeconomic History    Marital status:      Spouse name: Not on file    Number of children: Not on file    Years of education: Not on file    Highest education level: Not on file   Occupational History    Not on file   Tobacco Use    Smoking status: Never    Smokeless tobacco: Never   Vaping Use    Vaping status: Never Used   Substance and Sexual Activity    Alcohol use: Yes     Alcohol/week: 12.0 standard drinks of alcohol     Types: 12 Cans of beer per week    Drug use: No    Sexual activity: Not on file   Other Topics Concern    Not on file   Social History Narrative    Not on file     Social Drivers of Health     Financial Resource  Strain: Low Risk  (9/24/2024)    Financial Resource Strain     Within the past 12 months, have you or your family members you live with been unable to get utilities (heat, electricity) when it was really needed?: No   Food Insecurity: Low Risk  (9/24/2024)    Food Insecurity     Within the past 12 months, did you worry that your food would run out before you got money to buy more?: No     Within the past 12 months, did the food you bought just not last and you didn t have money to get more?: No   Transportation Needs: Low Risk  (9/24/2024)    Transportation Needs     Within the past 12 months, has lack of transportation kept you from medical appointments, getting your medicines, non-medical meetings or appointments, work, or from getting things that you need?: No   Physical Activity: Unknown (9/24/2024)    Exercise Vital Sign     Days of Exercise per Week: 4 days     Minutes of Exercise per Session: Not on file   Stress: No Stress Concern Present (9/24/2024)    [place] [employer] - Occupational Stress Questionnaire     Feeling of Stress : Not at all   Social Connections: Unknown (9/24/2024)    Social Connection and Isolation Panel [NHANES]     Frequency of Communication with Friends and Family: Not on file     Frequency of Social Gatherings with Friends and Family: Once a week     Attends Quaker Services: Not on file     Active Member of Clubs or Organizations: Not on file     Attends Club or Organization Meetings: Not on file     Marital Status: Not on file   Interpersonal Safety: Low Risk  (9/24/2024)    Interpersonal Safety     Do you feel physically and emotionally safe where you currently live?: Yes     Within the past 12 months, have you been hit, slapped, kicked or otherwise physically hurt by someone?: No     Within the past 12 months, have you been humiliated or emotionally abused in other ways by your partner or ex-partner?: No   Housing Stability: Low Risk  (9/24/2024)    Housing  Stability     Do you have housing? : Yes     Are you worried about losing your housing?: No          Allergies   Allergen Reactions    Shrimp Rash    Amlodipine Unknown     ankle swelling      Lisinopril-Hydrochlorothiazide Unknown     decreased libido      Penicillins Swelling     Unknown reaction    Tetanus Vaccines And Toxoid [Tetanus Toxoids] Unknown          Current Outpatient Medications:     cephALEXin (KEFLEX) 500 MG capsule, Take 1 capsule (500 mg) by mouth 4 times daily., Disp: 28 capsule, Rfl: 1    clindamycin (CLINDAMAX) 1 % external gel, , Disp: , Rfl:     guaiFENesin-codeine (ROBITUSSIN AC) 100-10 MG/5ML solution, Take 5-10 mLs by mouth every 4 hours as needed for cough, Disp: 240 mL, Rfl: 1    losartan-hydrochlorothiazide (HYZAAR) 100-25 MG tablet, Take 1 tablet by mouth daily., Disp: 90 tablet, Rfl: 3    predniSONE (DELTASONE) 20 MG tablet, Take 1 tablet (20 mg) by mouth 2 times daily, Disp: 10 tablet, Rfl: 1    rosuvastatin (CRESTOR) 10 MG tablet, Take 1 tablet (10 mg) by mouth daily., Disp: 90 tablet, Rfl: 2    sildenafil (VIAGRA) 100 MG tablet, TAKE 1/4 TO 1/2 TABLET BY MOUTH DAILY AS NEEDED FOR ERECTILE DYSFUNCTION. Strength: 100 mg, Disp: 30 tablet, Rfl: 11     Family History   Problem Relation Age of Onset    Coronary Artery Disease Father         Social History     Socioeconomic History    Marital status:      Spouse name: Not on file    Number of children: Not on file    Years of education: Not on file    Highest education level: Not on file   Occupational History    Not on file   Tobacco Use    Smoking status: Never    Smokeless tobacco: Never   Vaping Use    Vaping status: Never Used   Substance and Sexual Activity    Alcohol use: Yes     Alcohol/week: 12.0 standard drinks of alcohol     Types: 12 Cans of beer per week    Drug use: No    Sexual activity: Not on file   Other Topics Concern    Not on file   Social History Narrative    Not on file     Social Drivers of Health      Financial Resource Strain: Low Risk  (9/24/2024)    Financial Resource Strain     Within the past 12 months, have you or your family members you live with been unable to get utilities (heat, electricity) when it was really needed?: No   Food Insecurity: Low Risk  (9/24/2024)    Food Insecurity     Within the past 12 months, did you worry that your food would run out before you got money to buy more?: No     Within the past 12 months, did the food you bought just not last and you didn t have money to get more?: No   Transportation Needs: Low Risk  (9/24/2024)    Transportation Needs     Within the past 12 months, has lack of transportation kept you from medical appointments, getting your medicines, non-medical meetings or appointments, work, or from getting things that you need?: No   Physical Activity: Unknown (9/24/2024)    Exercise Vital Sign     Days of Exercise per Week: 4 days     Minutes of Exercise per Session: Not on file   Stress: No Stress Concern Present (9/24/2024)    Citizen of [place] of Occupational Health - Occupational Stress Questionnaire     Feeling of Stress : Not at all   Social Connections: Unknown (9/24/2024)    Social Connection and Isolation Panel [NHANES]     Frequency of Communication with Friends and Family: Not on file     Frequency of Social Gatherings with Friends and Family: Once a week     Attends Bahai Services: Not on file     Active Member of Clubs or Organizations: Not on file     Attends Club or Organization Meetings: Not on file     Marital Status: Not on file   Interpersonal Safety: Low Risk  (9/24/2024)    Interpersonal Safety     Do you feel physically and emotionally safe where you currently live?: Yes     Within the past 12 months, have you been hit, slapped, kicked or otherwise physically hurt by someone?: No     Within the past 12 months, have you been humiliated or emotionally abused in other ways by your partner or ex-partner?: No   Housing Stability: Low Risk   (9/24/2024)    Housing Stability     Do you have housing? : Yes     Are you worried about losing your housing?: No        Review of Systems - Patient denies fever, chills, rash, wound, stiffness,, weakness, heart burn, blood in stool, chest pain with activity, calf pain when walking, shortness of breath with activity, chronic cough, easy bleeding/bruising, swelling of ankles, excessive thirst, fatigue, depression, anxiety.  Patient admits to bilateral foot pain.      OBJECTIVE:  Appearance: alert, well appearing, and in no distress.    Pulse 67   Wt 113.4 kg (250 lb)   SpO2 98%   BMI 31.25 kg/m       Body mass index is 31.25 kg/m .     General appearance: Patient is alert and fully cooperative with history & exam.  No sign of distress is noted during the visit.  Psychiatric: Affect is pleasant & appropriate.  Patient appears motivated to improve health.  Respiratory: Breathing is regular & unlabored while sitting.  HEENT: Hearing is intact to spoken word.  Speech is clear.  No gross evidence of visual impairment that would impact ambulation.    Vascular: Dorsalis pedis and posterior tibial pulses are palpable. There is pedal hair growth no.  CFT < 3 sec from anterior tibial surface to distal digits bilaterally. There is no appreciable edema noted.  Dermatologic: Dry scaly skin bilaterally.  Turgor and texture are within normal limits. No coloration or temperature changes. No primary or secondary lesions noted.  Neurologic: All epicritic and proprioceptive sensations are grossly intact bilaterally.  Musculoskeletal: All active and passive ankle, subtalar, midtarsal, and 1st MPJ range of motion are grossly intact. Manual muscle strength is within normal limits bilaterally. All dorsiflexors, plantarflexors, invertors, evertors are intact bilaterally. Tenderness present to submetatarsal heads bilaterally on palpation.  No tenderness to bilateral feet or ankles with range of motion.  Increased height of the medial  longitudinal arch noted bilaterally.  Calf is soft/non-tender without warmth/induration    Imaging:       No images are attached to the encounter or orders placed in the encounter.     No results found.   No results found.           Antony Medellin DPM  [facility] Foot & Ankle Surgery/Podiatry

## 2024-10-24 NOTE — Clinical Note
10/24/2024      Marquis Martínez  [address] [place] [address]      Dear Colleague,    Thank you for referring your patient, Marquis Martínez, to the [facility]. Please see a copy of my visit note below.    No notes on file    Again, thank you for allowing me to participate in the care of your patient.        Sincerely,        Antony Steel DPM

## 2024-10-24 NOTE — PATIENT INSTRUCTIONS
[place] [facility] LOCATIONS  [facility]  [address]  [place] [address]  Phone: [phone]  Fax: [phone] [facility]  [address] [place] [address]  Home Medical Equipment, [address] Phone: [phone]  [facility], [address]  Phone [phone] [facility]  [address]  [place] [address]  Phone: [phone]   Fax: [phone]   [facility]  [address]  [place] [address]  Phone: [phone]  Fax: [phone] [facility]   Home Medical Equipment   [address] [address]  Phone :[phone]  [facility]  [address], [facility] [address]  Phone:[phone]   [facility] [facility]  [address] [place] [address]  [place] [address]  Phone: [phone]   Fax: [phone]   [facility]   [address]  [place] [address]  Phone: [phone]  Fax: [phone] [facility]   [address]  [address]  [place] [address]  Phone: [phone] [place]  [address] [place] [address]  Phone :[phone]             WEARING YOUR CUSTOM FOOT ORTHOTICS   Most insurance plans cover one pair of orthotics per year. You must check with your   insurance plan to see what your payment responsibility will be. Please call your   insurance company by calling the number on the back of your insurance card.   Orthotic's are non-refundable and non-returnable.   Orthotics are made of various designs. Some orthotics are covered with material that extends beyond your toes. If your orthotic is of this design, you will likely need to trim the toe end to get a proper fit. The insole from your shoe can be used as a template. Simply overlay the shoe insert on top of the custom orthotic.  Align the heel end while tracing the length of the insert onto the custom orthotic. Use a large scissor to trim the toe end until you get a proper fit in the shoe.   The orthotic needs to be pushed as far back in the shoe as possible. The heel portion should not ride forward so as not to irritate your heel.   Orthotics are designed to work with socks. Excessive perspiration will shorten the life span of the orthotics. Remove the orthotic from the shoe frequently for proper drying.   The break-in period lasts for weeks. People new to orthotics will likely experience new aches and pains. The orthotic is forcing your foot into a new position. Arch, foot and leg muscle aches and fatigue are common during these weeks. Minor discomfort can be considered normal break in phenomenon. Start wearing your orthotic around your home your first day. Limited activity for one to two hours is recommended. You can increase one or two additional hours each day provided the aches and pains are subsiding. The degree of discomfort, fatigue and problems will dictate the speed of break in. You may require multiple weeks to work up to full time use.   Do not continue wearing your orthotics if they are creating problems such as blisters or sores. Do not hesitate to call the clinic to speak with a nurse regarding orthotic   break in, fit, trimming, etc. You may also need to see the doctor if the orthotics are   simply not working out. Adjustments are sometimes made to improve orthotic   function.     Orthotics will only work in certain styles and types of shoes. Orthotics rarely work in dress shoes. Slip-ons, clogs, sandals and heels are particularly troublesome. Specially designed orthotics may be necessary for these types of shoes. Your custom orthotic was designed for activities that require appropriate walking or running shoes. Lace up athletic shoes, walking shoes or work boots should work appropriately. You may need a wider or longer  shoe. Shoes with a removable  or insert work best. In general, you want to remove an insert from the shoe before placing the orthotic into the shoe. Shoes without a removable liner may not work as well.     When purchasing new shoes, bring your orthotics along to get a proper fit. Shop at stores that are familiar with orthotics.   Frequent washing of the orthotic may shorten the life span of the top cover. The top cover can be replaced but will generally last one to five years depending on use and foot perspiration.

## 2024-10-29 ENCOUNTER — TRANSFERRED RECORDS (OUTPATIENT)
Dept: HEALTH INFORMATION MANAGEMENT | Facility: CLINIC | Age: 59
End: 2024-10-29

## 2024-10-31 ENCOUNTER — OFFICE VISIT (OUTPATIENT)
Dept: FAMILY MEDICINE | Facility: CLINIC | Age: 59
End: 2024-10-31
Payer: COMMERCIAL

## 2024-10-31 VITALS
BODY MASS INDEX: 30.21 KG/M2 | HEART RATE: 66 BPM | DIASTOLIC BLOOD PRESSURE: 86 MMHG | TEMPERATURE: 97.1 F | HEIGHT: 75 IN | WEIGHT: 243 LBS | RESPIRATION RATE: 16 BRPM | OXYGEN SATURATION: 100 % | SYSTOLIC BLOOD PRESSURE: 126 MMHG

## 2024-10-31 DIAGNOSIS — I10 PRIMARY HYPERTENSION: ICD-10-CM

## 2024-10-31 DIAGNOSIS — E78.5 HYPERLIPIDEMIA LDL GOAL <130: ICD-10-CM

## 2024-10-31 DIAGNOSIS — M19.012 PRIMARY OSTEOARTHRITIS OF LEFT SHOULDER: ICD-10-CM

## 2024-10-31 DIAGNOSIS — Z01.818 PREOP GENERAL PHYSICAL EXAM: Primary | ICD-10-CM

## 2024-10-31 DIAGNOSIS — N52.9 ERECTILE DYSFUNCTION, UNSPECIFIED ERECTILE DYSFUNCTION TYPE: ICD-10-CM

## 2024-10-31 DIAGNOSIS — C61 PRIMARY MALIGNANT NEOPLASM OF PROSTATE (H): ICD-10-CM

## 2024-10-31 PROBLEM — M54.12 CERVICAL RADICULOPATHY: Status: RESOLVED | Noted: 2023-11-06 | Resolved: 2024-10-31

## 2024-10-31 PROCEDURE — 99214 OFFICE O/P EST MOD 30 MIN: CPT | Mod: 25 | Performed by: INTERNAL MEDICINE

## 2024-10-31 PROCEDURE — 90471 IMMUNIZATION ADMIN: CPT | Performed by: INTERNAL MEDICINE

## 2024-10-31 PROCEDURE — 90673 RIV3 VACCINE NO PRESERV IM: CPT | Performed by: INTERNAL MEDICINE

## 2024-10-31 ASSESSMENT — PAIN SCALES - GENERAL: PAINLEVEL_OUTOF10: NO PAIN (0)

## 2024-10-31 NOTE — PATIENT INSTRUCTIONS
How to Take Your Medication Before Surgery  Preoperative Medication Instructions   Antiplatelet or Anticoagulation Medication Instructions   - Patient is on no antiplatelet or anticoagulation medications.    Additional Medication Instructions   - ACE/ARB: DO NOT TAKE on day of surgery (minimum 11 hours for general anesthesia).       Patient Education   Preparing for Your Surgery  For Adults  Getting started  In most cases, a nurse will call to review your health history and instructions. They will give you an arrival time based on your scheduled surgery time. Please be ready to share:  Your doctor's clinic name and phone number  Your medical, surgical, and anesthesia history  A list of allergies and sensitivities  A list of medicines, including herbal treatments and over-the-counter drugs  Whether the patient has a legal guardian (ask how to send us the papers in advance)  Note: You may not receive a call if you were seen at our PAC (Preoperative Assessment Center).  Please tell us if you're pregnant--or if there's any chance you might be pregnant. Some surgeries may injure a fetus (unborn baby), so they require a pregnancy test. Surgeries that are safe for a fetus don't always need a test, and you can choose whether to have one.   Preparing for surgery  Within 10 to 30 days of surgery: Have a pre-op exam (sometimes called an H&P, or History and Physical). This can be done at a clinic or pre-operative center.  If you're having a , you may not need this exam. Talk to your care team.  At your pre-op exam, talk to your care team about all medicines you take. (This includes CBD oil and any drugs, such as THC, marijuana, and other forms of cannabis.) If you need to stop any medicine before surgery, ask when to start taking it again.  This is for your safety. Many medicines and drugs can make you bleed too much during surgery. Some change how well surgery (anesthesia) drugs work.  Call your insurance company to  let them know you're having surgery. (If you don't have insurance, call [phone].)  Call your clinic if there's any change in your health. This includes a scrape or scratch near the surgery site, or any signs of a cold (sore throat, runny nose, cough, rash, fever).  Eating and drinking guidelines  For your safety: Unless your surgeon tells you otherwise, follow the guidelines below.  Eat and drink as normal until 8 hours before you arrive for surgery. After that, no food or milk. You can spit out gum when you arrive.  Drink clear liquids until 2 hours before you arrive. These are liquids you can see through, like water, Gatorade, and Propel Water. They also include plain black coffee and tea (no cream or milk).  No alcohol for 24 hours before you arrive. The night before surgery, stop any drinks that contain THC.  If your care team tells you to take medicine on the morning of surgery, it's okay to take it with a sip of water. No other medicines or drugs are allowed (including CBD oil)--follow your care team's instructions.  If you have questions the day of surgery, call your hospital or surgery center.   Preventing infection  Shower or bathe the night before and the morning of surgery. Follow the instructions your clinic gave you. (If no instructions, use regular soap.)  Don't shave or clip hair near your surgery site. We'll remove the hair if needed.  Don't smoke or vape the morning of surgery. No chewing tobacco for 6 hours before you arrive. A nicotine patch is okay. You may spit out nicotine gum when you arrive.  For some surgeries, the surgeon will tell you to fully quit smoking and nicotine.  We will make every effort to keep you safe from infection. We will:  Clean our hands often with soap and water (or an alcohol-based hand rub).  Clean the skin at your surgery site with a special soap that kills germs.  Give you a special gown to keep you warm. (Cold raises the risk of infection.)  Wear hair covers,  masks, gowns, and gloves during surgery.  Give antibiotic medicine, if prescribed. Not all surgeries need this medicine.  What to bring on the day of surgery  Photo ID and insurance card  Copy of your health care directive, if you have one  Glasses and hearing aids (bring cases)  You can't wear contacts during surgery  Inhaler and eye drops, if you use them (tell us about these when you arrive)  CPAP machine or breathing device, if you use them  A few personal items, if spending the night  If you have . . .  A pacemaker, ICD (cardiac defibrillator), or other implant: Bring the ID card.  An implanted stimulator: Bring the remote control.  A legal guardian: Bring a copy of the certified (court-stamped) guardianship papers.  Please remove any jewelry, including body piercings. Leave jewelry and other valuables at home.  If you're going home the day of surgery  You must have a responsible adult drive you home. They should stay with you overnight as well.  If you don't have someone to stay with you, and you aren't safe to go home alone, we may keep you overnight. Insurance often won't pay for this.  After surgery  If it's hard to control your pain or you need more pain medicine, please call your surgeon's office.  Questions?   If you have any questions for your care team, list them here:   ____________________________________________________________________________________________________________________________________________________________________________________________________________________________________________________________  For informational purposes only. Not to replace the advice of your health care provider. Copyright   2003, 2019 [facility] [facility]. All rights reserved. Clinically reviewed by Gabriel Nuno MD. [employer] [id number] - REV 08/24.

## 2024-10-31 NOTE — PROGRESS NOTES
Preoperative Evaluation  [facility]  [address] [facility] [address]  Phone: [phone]  Primary Provider: Rafael Perry MD  Pre-op Performing Provider: Clifford lCancy MD  Oct 31, 2024             10/31/2024   Surgical Information   What procedure is being done? left shoulder pyrocarbon hemiarthroplasty    Facility or Hospital where procedure/surgery will be performed: [facility]    Who is doing the procedure / surgery? dr rafael blanca    Date of surgery / procedure: 11/13/24    Time of surgery / procedure: tbd    Where do you plan to recover after surgery? at home with family        Patient-reported     Fax number for surgical facility: [phone]    Assessment & Plan     The proposed surgical procedure is considered INTERMEDIATE risk.    (Z01.818) Preop general physical exam  (primary encounter diagnosis)  (M19.012) Primary osteoarthritis of left shoulder  Comment: shoulder procedure. General anesthesia  Plan: hold NSAIDS per ortho. Take losartan early evening prior to surgery.     (I10) Primary hypertension  Comment: on losartan/hydrochlorothiazide with good control  Plan: Take losartan early evening prior to surgery.     (E78.5) Hyperlipidemia LDL goal <130  Comment: on crestor  Plan: Continue current treatment     (N52.9) Erectile dysfunction, unspecified erectile dysfunction type  Comment: on cialis  Plan: Continue current treatment     (C61) Primary malignant neoplasm of prostate (H)  Comment: noted  Plan:              - No identified additional risk factors other than previously addressed    Preoperative Medication Instructions  Antiplatelet or Anticoagulation Medication Instructions   - Patient is on no antiplatelet or anticoagulation medications.    Additional Medication Instructions   - ACE/ARB: DO NOT TAKE on day of surgery (minimum 11 hours for general anesthesia).    Recommendation  Approval given to proceed with proposed procedure, without  further diagnostic evaluation.    Everardo Cho is a 59 year old, presenting for the following:  Pre-Op Exam      HPI related to upcoming procedure: L shoulder procedure. No cardiac symptoms. No SPENCER, CP, palpitations. No sores or rashes on the L shoulder. No hx of anesthesia problems. No family history of anesthesia problems        10/31/2024   Pre-Op Questionnaire   Have you ever had a heart attack or stroke? No    Have you ever had surgery on your heart or blood vessels, such as a stent placement, a coronary artery bypass, or surgery on an artery in your head, neck, heart, or legs? No    Do you have chest pain with activity? No    Do you have a history of heart failure? No    Do you currently have a cold, bronchitis or symptoms of other infection? No    Do you have a cough, shortness of breath, or wheezing? No    Do you or anyone in your family have previous history of blood clots? No    Do you or does anyone in your family have a serious bleeding problem such as prolonged bleeding following surgeries or cuts? No    Have you ever had problems with anemia or been told to take iron pills? No    Have you had any abnormal blood loss such as black, tarry or bloody stools? No    Have you ever had a blood transfusion? No    Are you willing to have a blood transfusion if it is medically needed before, during, or after your surgery? Yes    Have you or any of your relatives ever had problems with anesthesia? No    Do you have sleep apnea, excessive snoring or daytime drowsiness? No    Do you have any artifical heart valves or other implanted medical devices like a pacemaker, defibrillator, or continuous glucose monitor? No    Do you have artificial joints? No    Are you allergic to latex? No        Patient-reported     Health Care Directive  Patient does not have a Health Care Directive: Discussed advance care planning with patient; however, patient declined at this time.        Patient Active Problem List    Diagnosis  Date Noted    Primary malignant neoplasm of prostate (H) 01/15/2024     Priority: Medium    Cervical radiculopathy 11/06/2023     Priority: Medium      No past medical history on file.  Past Surgical History:   Procedure Laterality Date    INJECT EPIDURAL CERVICAL N/A 11/30/2023    Procedure: INJECTION, SPINE, CERVICAL, EPIDURAL;  Surgeon: Balaji Hubbard MD;  Location: [facility] OR     Current Outpatient Medications   Medication Sig Dispense Refill    ammonium lactate (AMLACTIN) 12 % external cream Apply topically 2 times daily. 385 g 1    cephALEXin (KEFLEX) 500 MG capsule Take 1 capsule (500 mg) by mouth 4 times daily. 28 capsule 1    clindamycin (CLINDAMAX) 1 % external gel       guaiFENesin-codeine (ROBITUSSIN AC) 100-10 MG/5ML solution Take 5-10 mLs by mouth every 4 hours as needed for cough 240 mL 1    losartan-hydrochlorothiazide (HYZAAR) 100-25 MG tablet Take 1 tablet by mouth daily. 90 tablet 3    predniSONE (DELTASONE) 20 MG tablet Take 1 tablet (20 mg) by mouth 2 times daily 10 tablet 1    rosuvastatin (CRESTOR) 10 MG tablet Take 1 tablet (10 mg) by mouth daily. 90 tablet 2    sildenafil (VIAGRA) 100 MG tablet TAKE 1/4 TO 1/2 TABLET BY MOUTH DAILY AS NEEDED FOR ERECTILE DYSFUNCTION. Strength: 100 mg 30 tablet 11       Allergies   Allergen Reactions    Shrimp Rash    Amlodipine Unknown     ankle swelling      Lisinopril-Hydrochlorothiazide Unknown     decreased libido      Penicillins Swelling     Unknown reaction    Tetanus Vaccines And Toxoid [Tetanus Toxoids] Unknown        Social History     Tobacco Use    Smoking status: Never    Smokeless tobacco: Never   Substance Use Topics    Alcohol use: Yes     Alcohol/week: 12.0 standard drinks of alcohol     Types: 12 Cans of beer per week     Family History   Problem Relation Age of Onset    Coronary Artery Disease Father      History   Drug Use No             Review of Systems  Constitutional, HEENT, cardiovascular, pulmonary, GI, , musculoskeletal, neuro,  "skin, endocrine and psych systems are negative, except as otherwise noted.    Objective    There were no vitals taken for this visit.   Estimated body mass index is 31.25 kg/m  as calculated from the following:    Height as of 1/15/24: 1.905 m (6' 3\").    Weight as of 10/24/24: 113.4 kg (250 lb).  Physical Exam  GENERAL: alert and no distress  EYES: Eyes grossly normal to inspection, PERRL and conjunctivae and sclerae normal  HENT: ear canals and TM's normal, nose and mouth without ulcers or lesions  NECK: no adenopathy, no asymmetry, masses, or scars  RESP: lungs clear to auscultation - no rales, rhonchi or wheezes  CV: regular rate and rhythm, normal S1 S2, no S3 or S4, no murmur, click or rub, no peripheral edema  ABDOMEN: soft, nontender, no hepatosplenomegaly, no masses and bowel sounds normal  MS: no gross musculoskeletal defects noted, no edema  SKIN: no suspicious lesions or rashes  NEURO: Normal strength and tone, mentation intact and speech normal  PSYCH: mentation appears normal, affect normal/bright    Recent Labs   Lab Test 09/24/24  0851   HGB 14.4         POTASSIUM 4.7   CR 0.96        Diagnostics  No labs were ordered during this visit.   No EKG required, no history of coronary heart disease, significant arrhythmia, peripheral arterial disease or other structural heart disease.    Revised Cardiac Risk Index (RCRI)  The patient has the following serious cardiovascular risks for perioperative complications:   - No serious cardiac risks = 0 points     RCRI Interpretation: 0 points: Class I (very low risk - 0.4% complication rate)         Signed Electronically by: Clifford Clancy MD  A copy of this evaluation report is provided to the requesting physician.        "

## 2024-11-05 ENCOUNTER — OFFICE VISIT (OUTPATIENT)
Dept: PODIATRY | Facility: CLINIC | Age: 59
End: 2024-11-05
Attending: PODIATRIST
Payer: COMMERCIAL

## 2024-11-05 VITALS — SYSTOLIC BLOOD PRESSURE: 120 MMHG | DIASTOLIC BLOOD PRESSURE: 80 MMHG | OXYGEN SATURATION: 98 % | HEART RATE: 68 BPM

## 2024-11-05 DIAGNOSIS — M20.12 VALGUS DEFORMITY OF BOTH GREAT TOES: ICD-10-CM

## 2024-11-05 DIAGNOSIS — M20.11 VALGUS DEFORMITY OF BOTH GREAT TOES: ICD-10-CM

## 2024-11-05 DIAGNOSIS — M20.41 HAMMERTOE, BILATERAL: Primary | ICD-10-CM

## 2024-11-05 DIAGNOSIS — M20.42 HAMMERTOE, BILATERAL: Primary | ICD-10-CM

## 2024-11-05 PROCEDURE — 99214 OFFICE O/P EST MOD 30 MIN: CPT | Performed by: PODIATRIST

## 2024-11-05 ASSESSMENT — PAIN SCALES - GENERAL: PAINLEVEL_OUTOF10: NO PAIN (0)

## 2024-11-05 NOTE — PROGRESS NOTES
FOOT AND ANKLE SURGERY/PODIATRY PROGRESS NOTE        ASSESSMENT:   Crossover hammertoe deformity second digit bilateral  HAV      TREATMENT:  -I discussed with the patient that he has a significant hammertoe crossover deformity on the second digit on both feet, greater on the right versus the left.    -Based on the deformity present we discussed likely plantar plate injury/tear which may require surgical repair.  We reviewed arthroplasty procedure including EDL tendon lengthening along with capsulotomy of the second MPJ and Weil procedure of the second metatarsal head with screw fixation.  Postop course would include nonweightbearing for minimum 6 to 8 weeks.  Risk include infection, nonunion.      -After discussion of the above we also reviewed simple capsulotomy with tendon lengthening procedure and arthroplasty of the second digit knowing that full correction of the hammertoe deformity may not be able to be accomplished.    -After discussion of surgical options patient states he would like to proceed with conservative treatment at this time.  I referred him for orthotics with a metatarsal bar to offload the second and third MPJs.    -Patient's questions invited and answered. He was encouraged to call my office with any further questions or concerns.     30 minutes spent on the day of encounter doing chart review, history and exam, documentation, and further activities as noted.     Jamaal Dejesus DPM  [facility] Podiatry/Foot & Ankle Surgery      HPI: I was asked to see this patient by Dr. Steel for a crossover hammertoe deformities on both feet.  Patient was seen by Dr. Steel and recommended for arthroplasty of the second digit on the right foot.  Patient is scheduled for left shoulder surgery in the near future.  He was also referred for custom orthotics but canceled this appointment.    No past medical history on file.    Past Surgical History:   Procedure Laterality Date    INJECT EPIDURAL  CERVICAL N/A 11/30/2023    Procedure: INJECTION, SPINE, CERVICAL, EPIDURAL;  Surgeon: Balaji Hubbard MD;  Location: [facility] OR       Allergies   Allergen Reactions    Shrimp Rash    Amlodipine Unknown     ankle swelling      Lisinopril-Hydrochlorothiazide Unknown     decreased libido      Penicillins Swelling     Unknown reaction    Tetanus Vaccines And Toxoid [Tetanus Toxoids] Unknown         Current Outpatient Medications:     ammonium lactate (AMLACTIN) 12 % external cream, Apply topically 2 times daily., Disp: 385 g, Rfl: 1    clindamycin (CLINDAMAX) 1 % external gel, , Disp: , Rfl:     losartan-hydrochlorothiazide (HYZAAR) 100-25 MG tablet, Take 1 tablet by mouth daily., Disp: 90 tablet, Rfl: 3    rosuvastatin (CRESTOR) 10 MG tablet, Take 1 tablet (10 mg) by mouth daily., Disp: 90 tablet, Rfl: 2    sildenafil (VIAGRA) 100 MG tablet, TAKE 1/4 TO 1/2 TABLET BY MOUTH DAILY AS NEEDED FOR ERECTILE DYSFUNCTION. Strength: 100 mg, Disp: 30 tablet, Rfl: 11    Family History   Problem Relation Age of Onset    Coronary Artery Disease Father        Social History     Socioeconomic History    Marital status:      Spouse name: Not on file    Number of children: Not on file    Years of education: Not on file    Highest education level: Not on file   Occupational History    Not on file   Tobacco Use    Smoking status: Never    Smokeless tobacco: Never   Vaping Use    Vaping status: Never Used   Substance and Sexual Activity    Alcohol use: Yes     Alcohol/week: 12.0 standard drinks of alcohol     Types: 12 Cans of beer per week    Drug use: No    Sexual activity: Not on file   Other Topics Concern    Not on file   Social History Narrative    Not on file     Social Drivers of Health     Financial Resource Strain: Low Risk  (9/24/2024)    Financial Resource Strain     Within the past 12 months, have you or your family members you live with been unable to get utilities (heat, electricity) when it was really needed?: No    Food Insecurity: Low Risk  (9/24/2024)    Food Insecurity     Within the past 12 months, did you worry that your food would run out before you got money to buy more?: No     Within the past 12 months, did the food you bought just not last and you didn t have money to get more?: No   Transportation Needs: Low Risk  (9/24/2024)    Transportation Needs     Within the past 12 months, has lack of transportation kept you from medical appointments, getting your medicines, non-medical meetings or appointments, work, or from getting things that you need?: No   Physical Activity: Unknown (9/24/2024)    Exercise Vital Sign     Days of Exercise per Week: 4 days     Minutes of Exercise per Session: Not on file   Stress: No Stress Concern Present (9/24/2024)    [employer] [place] [employer] - Occupational Stress Questionnaire     Feeling of Stress : Not at all   Social Connections: Unknown (9/24/2024)    Social Connection and Isolation Panel [NHANES]     Frequency of Communication with Friends and Family: Not on file     Frequency of Social Gatherings with Friends and Family: Once a week     Attends Roman [employer] Services: Not on file     Active Member of Clubs or Organizations: Not on file     Attends Club or Organization Meetings: Not on file     Marital Status: Not on file   Interpersonal Safety: Low Risk  (9/24/2024)    Interpersonal Safety     Do you feel physically and emotionally safe where you currently live?: Yes     Within the past 12 months, have you been hit, slapped, kicked or otherwise physically hurt by someone?: No     Within the past 12 months, have you been humiliated or emotionally abused in other ways by your partner or ex-partner?: No   Housing Stability: Low Risk  (9/24/2024)    Housing Stability     Do you have housing? : Yes     Are you worried about losing your housing?: No       10 point Review of Systems is negative except for pamelae which is noted in HPI.     /80   Pulse 68   SpO2  98%     BMI= There is no height or weight on file to calculate BMI.    OBJECTIVE:  General appearance: Patient is alert and fully cooperative with history & exam.  No sign of distress is noted during the visit.    Vascular: Dorsalis pedis and posterior tibial pulses are palpable. There is pedal hair growth bilateral.  CFT < 3 sec from anterior tibial surface to distal digits bilateral. There is no appreciable edema noted.  Dermatologic: Turgor and texture are within normal limits. No coloration or temperature changes. No primary or secondary lesions noted.  Neurologic: All epicritic and proprioceptive sensations are grossly intact bilateral.  Musculoskeletal: Contracted digits bilateral feet with crossover hammertoe deformity second digit bilaterally, right foot greater deformity than the left foot.  Mild bunion deformity bilateral feet.

## 2024-11-05 NOTE — LETTER
11/5/2024      Marquis Martínez  [address] [place] [address]      Dear Colleague,    Thank you for referring your patient, Marquis Martínez, to the [facility]. Please see a copy of my visit note below.        FOOT AND ANKLE SURGERY/PODIATRY PROGRESS NOTE        ASSESSMENT:   Crossover hammertoe deformity second digit bilateral  HAV      TREATMENT:  -I discussed with the patient that he has a significant hammertoe crossover deformity on the second digit on both feet, greater on the right versus the left.    -Based on the deformity present we discussed likely plantar plate injury/tear which may require surgical repair.  We reviewed arthroplasty procedure including EDL tendon lengthening along with capsulotomy of the second MPJ and Weil procedure of the second metatarsal head with screw fixation.  Postop course would include nonweightbearing for minimum 6 to 8 weeks.  Risk include infection, nonunion.      -After discussion of the above we also reviewed simple capsulotomy with tendon lengthening procedure and arthroplasty of the second digit knowing that full correction of the hammertoe deformity may not be able to be accomplished.    -After discussion of surgical options patient states he would like to proceed with conservative treatment at this time.  I referred him for orthotics with a metatarsal bar to offload the second and third MPJs.    -Patient's questions invited and answered. He was encouraged to call my office with any further questions or concerns.     30 minutes spent on the day of encounter doing chart review, history and exam, documentation, and further activities as noted.     Jamaal Dejesus DPM  [facility] Podiatry/Foot & Ankle Surgery      HPI: I was asked to see this patient by Dr. Steel for a crossover hammertoe deformities on both feet.  Patient was seen by Dr. Steel and recommended for arthroplasty of the second digit on the right foot.  Patient  is scheduled for left shoulder surgery in the near future.  He was also referred for custom orthotics but canceled this appointment.    No past medical history on file.    Past Surgical History:   Procedure Laterality Date     INJECT EPIDURAL CERVICAL N/A 11/30/2023    Procedure: INJECTION, SPINE, CERVICAL, EPIDURAL;  Surgeon: Balaji Hubbard MD;  Location: [facility] OR       Allergies   Allergen Reactions     Shrimp Rash     Amlodipine Unknown     ankle swelling       Lisinopril-Hydrochlorothiazide Unknown     decreased libido       Penicillins Swelling     Unknown reaction     Tetanus Vaccines And Toxoid [Tetanus Toxoids] Unknown         Current Outpatient Medications:      ammonium lactate (AMLACTIN) 12 % external cream, Apply topically 2 times daily., Disp: 385 g, Rfl: 1     clindamycin (CLINDAMAX) 1 % external gel, , Disp: , Rfl:      losartan-hydrochlorothiazide (HYZAAR) 100-25 MG tablet, Take 1 tablet by mouth daily., Disp: 90 tablet, Rfl: 3     rosuvastatin (CRESTOR) 10 MG tablet, Take 1 tablet (10 mg) by mouth daily., Disp: 90 tablet, Rfl: 2     sildenafil (VIAGRA) 100 MG tablet, TAKE 1/4 TO 1/2 TABLET BY MOUTH DAILY AS NEEDED FOR ERECTILE DYSFUNCTION. Strength: 100 mg, Disp: 30 tablet, Rfl: 11    Family History   Problem Relation Age of Onset     Coronary Artery Disease Father        Social History     Socioeconomic History     Marital status:      Spouse name: Not on file     Number of children: Not on file     Years of education: Not on file     Highest education level: Not on file   Occupational History     Not on file   Tobacco Use     Smoking status: Never     Smokeless tobacco: Never   Vaping Use     Vaping status: Never Used   Substance and Sexual Activity     Alcohol use: Yes     Alcohol/week: 12.0 standard drinks of alcohol     Types: 12 Cans of beer per week     Drug use: No     Sexual activity: Not on file   Other Topics Concern     Not on file   Social History Narrative     Not on file      Social Drivers of Health     Financial Resource Strain: Low Risk  (9/24/2024)    Financial Resource Strain      Within the past 12 months, have you or your family members you live with been unable to get utilities (heat, electricity) when it was really needed?: No   Food Insecurity: Low Risk  (9/24/2024)    Food Insecurity      Within the past 12 months, did you worry that your food would run out before you got money to buy more?: No      Within the past 12 months, did the food you bought just not last and you didn t have money to get more?: No   Transportation Needs: Low Risk  (9/24/2024)    Transportation Needs      Within the past 12 months, has lack of transportation kept you from medical appointments, getting your medicines, non-medical meetings or appointments, work, or from getting things that you need?: No   Physical Activity: Unknown (9/24/2024)    Exercise Vital Sign      Days of Exercise per Week: 4 days      Minutes of Exercise per Session: Not on file   Stress: No Stress Concern Present (9/24/2024)    [employer] [place] [employer] - Occupational Stress Questionnaire      Feeling of Stress : Not at all   Social Connections: Unknown (9/24/2024)    Social Connection and Isolation Panel [NHANES]      Frequency of Communication with Friends and Family: Not on file      Frequency of Social Gatherings with Friends and Family: Once a week      Attends Mosque Services: Not on file      Active Member of Clubs or Organizations: Not on file      Attends Club or Organization Meetings: Not on file      Marital Status: Not on file   Interpersonal Safety: Low Risk  (9/24/2024)    Interpersonal Safety      Do you feel physically and emotionally safe where you currently live?: Yes      Within the past 12 months, have you been hit, slapped, kicked or otherwise physically hurt by someone?: No      Within the past 12 months, have you been humiliated or emotionally abused in other ways by your partner or  ex-partner?: No   Housing Stability: Low Risk  (9/24/2024)    Housing Stability      Do you have housing? : Yes      Are you worried about losing your housing?: No       10 point Review of Systems is negative except for hammertoe which is noted in HPI.     /80   Pulse 68   SpO2 98%     BMI= There is no height or weight on file to calculate BMI.    OBJECTIVE:  General appearance: Patient is alert and fully cooperative with history & exam.  No sign of distress is noted during the visit.    Vascular: Dorsalis pedis and posterior tibial pulses are palpable. There is pedal hair growth bilateral.  CFT < 3 sec from anterior tibial surface to distal digits bilateral. There is no appreciable edema noted.  Dermatologic: Turgor and texture are within normal limits. No coloration or temperature changes. No primary or secondary lesions noted.  Neurologic: All epicritic and proprioceptive sensations are grossly intact bilateral.  Musculoskeletal: Contracted digits bilateral feet with crossover hammertoe deformity second digit bilaterally, right foot greater deformity than the left foot.  Mild bunion deformity bilateral feet.      Again, thank you for allowing me to participate in the care of your patient.        Sincerely,        Jamaal Dejesus DPM

## 2024-11-05 NOTE — PATIENT INSTRUCTIONS
What are Prescription Custom Orthotics?  Custom orthotics are specially-made devices designed to support and comfort your feet. Prescription orthotics are crafted for you and no one else. They match the contours of your feet precisely and are designed for the way you move. Orthotics are only manufactured after a podiatrist has conducted a complete evaluation of your feet, ankles, and legs, so the orthotic can accommodate your unique foot structure and pathology.  Prescription orthotics are divided into two categories:  Functional orthotics are designed to control abnormal motion. They may be used to treat foot pain caused by abnormal motion; they can also be used to treat injuries such as shin splints or tendinitis. Functional orthotics are usually crafted of a semi-rigid material such as plastic or graphite.  Accommodative orthotics are softer and meant to provide additional cushioning and support. They can be used to treat diabetic foot ulcers, painful calluses on the bottom of the foot, and other uncomfortable conditions.  Podiatrists use orthotics to treat foot problems such as plantar fasciitis, bursitis, tendinitis, diabetic foot ulcers, and foot, ankle, and heel pain. Clinical research studies have shown that podiatrist-prescribed foot orthotics decrease foot pain and improve function.  Orthotics typically cost more than shoe inserts purchased in a retail store, but the additional cost is usually well worth it. Unlike shoe inserts, orthotics are molded to fit each individual foot, so you can be sure that your orthotics fit and do what they're supposed to do. Prescription orthotics are also made of top-notch materials and last many years when cared for properly. Insurance often helps pay for prescription orthotics.  What are Shoe Inserts?   You've seen them at the grocery store and at the mall. You've probably even seen them on TV and online. Shoe inserts are any kind of non-prescription foot support designed  to be worn inside a shoe. Pre-packaged, mass produced, arch supports are shoe inserts. So are the  custom-made  insoles and foot supports that you can order online or at retail stores. Unless the device has been prescribed by a doctor and crafted for your specific foot, it's a shoe insert, not a custom orthotic device--despite what the ads might say.  Shoe inserts can be very helpful for a variety of foot ailments, including flat arches and foot and leg pain. They can cushion your feet, provide comfort, and support your arches, but they can't correct biomechanical foot problems or cure long-standing foot issues.  The most common types of shoe inserts are:  Arch supports: Some people have high arches. Others have low arches or flat feet. Arch supports generally have a  bumped-up  appearance and are designed to support the foot's natural arch.   Insoles: Insoles slip into your shoe to provide extra cushioning and support. Insoles are often made of gel, foam, or plastic.   Heel liners: Heel liners, sometimes called heel pads or heel cups, provide extra cushioning in the heel region. They may be especially useful for patients who have foot pain caused by age-related thinning of the heels' natural fat pads.   Foot cushions: Do your shoes rub against your heel or your toes? Foot cushions come in many different shapes and sizes and can be used as a barrier between you and your shoe.  Choosing an Over-the-Counter Shoe Insert  Selecting a shoe insert from the wide variety of devices on the market can be overwhelming. Here are some podiatrist-tested tips to help you find the insert that best meets your needs:  Consider your health. Do you have diabetes? Problems with circulation? An over-the-counter insert may not be your best bet. Diabetes and poor circulation increase your risk of foot ulcers and infections, so schedule an appointment with a podiatrist. He or she can help you select a solution that won't cause additional  health problems.   Think about the purpose. Are you planning to run a marathon, or do you just need a little arch support in your work shoes? Look for a product that fits your planned level of activity.   Bring your shoes. For the insert to be effective, it has to fit into your shoes. So bring your sneakers, dress shoes, or work boots--whatever you plan to wear with your insert. Look for an insert that will fit the contours of your shoe.   Try them on. If all possible, slip the insert into your shoe and try it out. Walk around a little. How does it feel? Don't assume that feelings of pressure will go away with continued wear. (If you can't try the inserts at the store, ask about the store's return policy and hold on to your receipt.)    Please call one of the [place] locations below to schedule an appointment. If you received a prescription please bring it with you to your appointment. Some locations are limited to what they carry.    Office Locations    [facility]  [address] [place] [address]  Home Medical Equipment, [address]   Phone: [phone]   Orthotics and Prosthetics, [address]   Phone: [phone]    [facility]   Home Medical Equipment   [address] [address]  Phone :[phone]  Orthotics and Prosthetics   [address], [facility] [address]  Phone:[phone]          formerly [facility] [place]  [address]  [address]   [place] [address]   Phone: [phone]    [facility]  [address]  [place] [address]  Phone: [phone]    [facility]  [address]  [place] [address]  Phone: [phone]  Fax: [phone]    [facility]   [address]  [place] [address]  Phone:  [phone]    [facility]  [address]  [place] [address]  Phone: [phone]    [facility]  [address]  [place] [address]  Phone: [phone]    [place]   [address] [place] [address]   Phone: [phone]    WEARING YOUR CUSTOM FOOT ORTHOTICS   Most insurance plans cover one pair of orthotics per year. You must check with your   insurance plan to see what your payment responsibility will be. Please call your   insurance company by calling the number on the back of your insurance card.   Orthotic's are non-refundable and non-returnable.   Orthotics are made of various designs. Some orthotics are covered with material that extends beyond your toes. If your orthotic is of this design, you will likely need to trim the toe end to get a proper fit. The insole from your shoe can be used as a template. Simply overlay the shoe insert on top of the custom orthotic. Align the heel end while tracing the length of the insert onto the custom orthotic. Use a large scissor to trim the toe end until you get a proper fit in the shoe.   The orthotic needs to be pushed as far back in the shoe as possible. The heel portion should not ride forward so as not to irritate your heel.   Orthotics are designed to work with socks. Excessive perspiration will shorten the life span of the orthotics. Remove the orthotic from the shoe frequently for proper drying.   The break-in period lasts for weeks. People new to orthotics will likely experience new aches and pains. The orthotic is forcing your foot into a new position. Arch, foot and leg muscle aches and fatigue are common during these weeks. Minor discomfort can be considered normal break in phenomenon. Start wearing your orthotic around your home your first day. Limited activity for one to two hours is recommended. You can increase one or two additional hours each  day provided the aches and pains are subsiding. The degree of discomfort, fatigue and problems will dictate the speed of break in. You may require multiple weeks to work up to full time use.   Do not continue wearing your orthotics if they are creating problems such as blisters or sores. Do not hesitate to call the clinic to speak with a nurse regarding orthotic   break in, fit, trimming, etc. You may also need to see the doctor if the orthotics are   simply not working out. Adjustments are sometimes made to improve orthotic   function.     Orthotics will only work in certain styles and types of shoes. Orthotics rarely work in dress shoes. Slip-ons, clogs, sandals and heels are particularly troublesome. Specially designed orthotics may be necessary for these types of shoes. Your custom orthotic was designed for activities that require appropriate walking or running shoes. Lace up athletic shoes, walking shoes or work boots should work appropriately. You may need a wider or longer shoe. Shoes with a removable  or insert work best. In general, you want to remove an insert from the shoe before placing the orthotic into the shoe. Shoes without a removable liner may not work as well.     When purchasing new shoes, bring your orthotics along to get a proper fit. Shop at stores that are familiar with orthotics.   Frequent washing of the orthotic may shorten the life span of the top cover. The top cover can be replaced but will generally last one to five years depending on use and foot perspiration.

## 2024-11-26 ENCOUNTER — TRANSFERRED RECORDS (OUTPATIENT)
Dept: HEALTH INFORMATION MANAGEMENT | Facility: CLINIC | Age: 59
End: 2024-11-26
Payer: COMMERCIAL

## 2025-02-18 ENCOUNTER — TRANSFERRED RECORDS (OUTPATIENT)
Dept: HEALTH INFORMATION MANAGEMENT | Facility: CLINIC | Age: 60
End: 2025-02-18
Payer: COMMERCIAL

## 2025-02-21 PROBLEM — D12.6 COLON ADENOMA: Status: ACTIVE | Noted: 2025-02-21

## 2025-03-11 ENCOUNTER — TRANSFERRED RECORDS (OUTPATIENT)
Dept: HEALTH INFORMATION MANAGEMENT | Facility: CLINIC | Age: 60
End: 2025-03-11
Payer: COMMERCIAL

## 2025-03-12 ENCOUNTER — TRANSFERRED RECORDS (OUTPATIENT)
Dept: HEALTH INFORMATION MANAGEMENT | Facility: CLINIC | Age: 60
End: 2025-03-12
Payer: COMMERCIAL

## 2025-03-24 ENCOUNTER — OFFICE VISIT (OUTPATIENT)
Dept: FAMILY MEDICINE | Facility: CLINIC | Age: 60
End: 2025-03-24
Payer: COMMERCIAL

## 2025-03-24 VITALS
RESPIRATION RATE: 10 BRPM | WEIGHT: 250.7 LBS | DIASTOLIC BLOOD PRESSURE: 82 MMHG | HEART RATE: 74 BPM | TEMPERATURE: 98 F | OXYGEN SATURATION: 96 % | SYSTOLIC BLOOD PRESSURE: 118 MMHG | BODY MASS INDEX: 31.17 KG/M2 | HEIGHT: 75 IN

## 2025-03-24 DIAGNOSIS — J20.9 ACUTE BRONCHITIS, UNSPECIFIED ORGANISM: Primary | ICD-10-CM

## 2025-03-24 PROCEDURE — 3074F SYST BP LT 130 MM HG: CPT

## 2025-03-24 PROCEDURE — 99213 OFFICE O/P EST LOW 20 MIN: CPT

## 2025-03-24 PROCEDURE — 1125F AMNT PAIN NOTED PAIN PRSNT: CPT

## 2025-03-24 PROCEDURE — 3079F DIAST BP 80-89 MM HG: CPT

## 2025-03-24 PROCEDURE — G2211 COMPLEX E/M VISIT ADD ON: HCPCS

## 2025-03-24 RX ORDER — BENZONATATE 200 MG/1
200 CAPSULE ORAL 3 TIMES DAILY PRN
Qty: 30 CAPSULE | Refills: 0 | Status: SHIPPED | OUTPATIENT
Start: 2025-03-24

## 2025-03-24 RX ORDER — ALBUTEROL SULFATE 90 UG/1
2 INHALANT RESPIRATORY (INHALATION) EVERY 6 HOURS PRN
Qty: 18 G | Refills: 0 | Status: SHIPPED | OUTPATIENT
Start: 2025-03-24

## 2025-03-24 RX ORDER — METHYLPREDNISOLONE 4 MG/1
TABLET ORAL
Qty: 21 TABLET | Refills: 0 | Status: SHIPPED | OUTPATIENT
Start: 2025-03-24

## 2025-03-24 ASSESSMENT — PAIN SCALES - GENERAL: PAINLEVEL_OUTOF10: MODERATE PAIN (4)

## 2025-03-24 ASSESSMENT — ASTHMA QUESTIONNAIRES
QUESTION_4 LAST FOUR WEEKS HOW OFTEN HAVE YOU USED YOUR RESCUE INHALER OR NEBULIZER MEDICATION (SUCH AS ALBUTEROL): NOT AT ALL
QUESTION_1 LAST FOUR WEEKS HOW MUCH OF THE TIME DID YOUR ASTHMA KEEP YOU FROM GETTING AS MUCH DONE AT WORK, SCHOOL OR AT HOME: A LITTLE OF THE TIME
QUESTION_2 LAST FOUR WEEKS HOW OFTEN HAVE YOU HAD SHORTNESS OF BREATH: THREE TO SIX TIMES A WEEK
QUESTION_5 LAST FOUR WEEKS HOW WOULD YOU RATE YOUR ASTHMA CONTROL: POORLY CONTROLLED
ACT_TOTALSCORE: 15
QUESTION_3 LAST FOUR WEEKS HOW OFTEN DID YOUR ASTHMA SYMPTOMS (WHEEZING, COUGHING, SHORTNESS OF BREATH, CHEST TIGHTNESS OR PAIN) WAKE YOU UP AT NIGHT OR EARLIER THAN USUAL IN THE MORNING: FOUR OR MORE NIGHTS A WEEK

## 2025-03-24 ASSESSMENT — ENCOUNTER SYMPTOMS: WHEEZING: 1

## 2025-03-24 NOTE — PROGRESS NOTES
Assessment & Plan     (J20.9) Acute bronchitis, unspecified organism  (primary encounter diagnosis)  Comment: Acute and stable. No signs of respiratory distress, vital signs stable, and no red flag signs requiring immediate need for medical attention.  No signs of pneumonia today, which helps rule out the need for chest x-ray.  Holding off on influenza and COVID screening today, as it will not largely change our plan of care moving forward.  Physical exam findings and HPI are supportive of the diagnosis of acute bronchitis.  Educated patient that the majority of bronchitis is caused by a viral illness, so antibiotics will not be part of his plan of care today.  Adding on Medrol Dosepak for reduced airway inflammation and cough management, and sending patient with Tessalon Perles and albuterol for coughing in the meantime.  Can utilize a number of other supportive therapy options as listed in the after visit summary, but would like him to come back in about a week if symptoms are not improving for further discussion regarding next steps in the plan of care. Offered education on medications including appropriate dosing, possible side effects, and possible adverse effects.  Education given on return to clinic instructions as well as alarm signs that would require the need for immediate medical attention.  Patient attested to understanding.  Plan: benzonatate (TESSALON) 200 MG capsule,         albuterol (PROAIR HFA/PROVENTIL HFA/VENTOLIN         HFA) 108 (90 Base) MCG/ACT inhaler,         methylPREDNISolone (MEDROL DOSEPAK) 4 MG tablet        therapy pack      This progress note has been dictated, with use of voice recognition software. Any grammatical, typographical, or context errors are unintentional and inherent to use of voice recognition software.     Prescription drug management  I spent a total of 10 minutes on the day of the visit.   Time spent by me today doing chart review, history and exam, documentation  and further activities per the note      FUTURE APPOINTMENTS:       - Follow-up visit in 1 week if symptoms are not improving       - Follow-up for annual visit or as needed  Patient Instructions   There were no signs of pneumonia.    Your symptoms are most likely due to acute bronchitis.  This is inflammation of the tubes leading into the lungs, most often due to a viral infection and an antibiotic will not help this.    I have prescribed an albuterol inhaler to help with the cough/wheezing.  I would like you to begin a using 2 puffs of this medication anytime you are experiencing coughing, wheezing, or shortness of breath.  Give this medication about 10 minutes to work, and you may repeat another 2 puffs after that time if symptoms are still present.  If symptoms persist after 4 puffs, or if you are needing this medication more than 4 times a day, please follow-up right away    I have also prescribed a medication called Tessalon Perels. This is a cough medication that can be useful to prevent coughing jags overnight, and help you get a good nights rest. This medication can be used during the day, but it can often make people a bit drowsy, so I would encourage you to take it in the evening for the first time to determine how your body reacts to the medication.    Medrol Dosepak: This medication is an oral steroid.  Oftentimes an oral steroid can help to reduce inflammation that is causing ongoing discomfort. Please take this medication as the packaging describes, and be sure to finish the entire course. These types of medication can cause side effects including insomnia, increased hunger, and emotional irritability.  Please take it in the morning with food to help reduce the side effects, and they should resolve once you are done taking the medication.     Please follow-up in 1 week if symptoms are not improving.     Please monitor symptoms carefully.  If developing chest pain, shortness of breath, fever, coughing  "up blood, extreme fatigue, or any other new, concerning symptoms, come back to clinic or go to ER immediately.          Subjective   Marquis is a 60 year old, presenting for the following health issues:  Wheezing (Pt reports wheezing, chest pain 4/10 when coughing, deniesSOB - had bronchitis last year \"feels the same\")        3/24/2025     7:43 AM   Additional Questions   Roomed by Jaja DIAL RN   Accompanied by self         3/24/2025     7:43 AM   Patient Reported Additional Medications   Patient reports taking the following new medications none     History of Present Illness       Reason for visit:  Bronchitis  Symptom onset:  1-3 days ago   He is taking medications regularly.      Marquis is a 60-year-old male with a past medical history significant for hyperlipidemia, hypertension, osteoarthritis of the left shoulder, and erectile dysfunction who presents today with concerns for respiratory symptoms.  Patient reports that beginning on Friday, March 21 he began to experience a cough that progressively worsened as the weekend went on.  He now presents today with concerns for chest congestion, loose cough, wheezing, and loss of sleep due to the symptoms.  He does appreciate a mild discomfort in his chest when he coughs aggressively, but outside of this he declines any persistent chest pain, shortness of breath, or difficulty breathing.  He is experiencing no other significant symptoms, and declines concerns for sore throat, sinus pressure or congestion, ear pain, headache, abdominal pain, nausea, vomiting, diarrhea, fever, chills, or bodyaches.  He has not been around anybody else with similar symptoms to the best of his knowledge.  He has been vaccinated against influenza this year, but did not receive his COVID vaccination.  He has been utilizing Tylenol at home for symptom management with little success.    Review of Systems  Constitutional, HEENT, cardiovascular, pulmonary, gi and gu systems are negative, except as " "otherwise noted.      Objective    /82 (BP Location: Left arm, Patient Position: Sitting, Cuff Size: Adult Regular)   Pulse 74   Temp 98  F (36.7  C) (Temporal)   Resp 10   Ht 1.905 m (6' 3\")   Wt 113.7 kg (250 lb 11.2 oz)   SpO2 96%   BMI 31.34 kg/m    Body mass index is 31.34 kg/m .  Physical Exam   GENERAL: alert and no distress  EYES: Eyes grossly normal to inspection, PERRL and conjunctivae and sclerae normal  HENT: normal cephalic/atraumatic, ear canals and TM's normal, nose and mouth without ulcers or lesions, oral mucous membranes moist, and tonsillar erythema  NECK: no adenopathy, no asymmetry, masses, or scars  RESP: Easy rate, depth, and work of breathing without use of accessory muscles.  Expiratory wheezing generalized throughout all lung fields.  No rales or rhonchi  CV: regular rate and rhythm, normal S1 S2, no S3 or S4, no murmur, click or rub, no peripheral edema  ABDOMEN: soft, nontender, no hepatosplenomegaly, no masses and bowel sounds normal  MS: no gross musculoskeletal defects noted, no edema    Wendi Florez DNP FNP-C  Family Nurse Practitioner - Same Day Provider  [facility] [place]        Signed Electronically by: DIMITRIS Gonzalez CNP    "

## 2025-03-24 NOTE — PATIENT INSTRUCTIONS
There were no signs of pneumonia.    Your symptoms are most likely due to acute bronchitis.  This is inflammation of the tubes leading into the lungs, most often due to a viral infection and an antibiotic will not help this.    I have prescribed an albuterol inhaler to help with the cough/wheezing.  I would like you to begin a using 2 puffs of this medication anytime you are experiencing coughing, wheezing, or shortness of breath.  Give this medication about 10 minutes to work, and you may repeat another 2 puffs after that time if symptoms are still present.  If symptoms persist after 4 puffs, or if you are needing this medication more than 4 times a day, please follow-up right away    I have also prescribed a medication called Tessalon Perels. This is a cough medication that can be useful to prevent coughing jags overnight, and help you get a good nights rest. This medication can be used during the day, but it can often make people a bit drowsy, so I would encourage you to take it in the evening for the first time to determine how your body reacts to the medication.    Medrol Dosepak: This medication is an oral steroid.  Oftentimes an oral steroid can help to reduce inflammation that is causing ongoing discomfort. Please take this medication as the packaging describes, and be sure to finish the entire course. These types of medication can cause side effects including insomnia, increased hunger, and emotional irritability.  Please take it in the morning with food to help reduce the side effects, and they should resolve once you are done taking the medication.     Please follow-up in 1 week if symptoms are not improving.     Please monitor symptoms carefully.  If developing chest pain, shortness of breath, fever, coughing up blood, extreme fatigue, or any other new, concerning symptoms, come back to clinic or go to ER immediately.

## 2025-04-10 ENCOUNTER — TRANSFERRED RECORDS (OUTPATIENT)
Dept: ADMINISTRATIVE | Facility: CLINIC | Age: 60
End: 2025-04-10
Payer: COMMERCIAL

## 2025-04-10 NOTE — PROCEDURES
[facility]  [address], [place] [address]    Patient Name: Marquis Martínez Gender:  Male  Exam Date: 04/10/2025 Patient ID:  [id number]  :   1965   Medical Record #:  [id number]  Attending MD: Trinidad Mosley MD  ================================================================================  Procedure: Hemorrhoid Banding  Indications: Internal hemorrhoids  Referring MD: Referral Self  Primary MD: Rafael Perry MD     Medications: none      Complications: No immediate complications  =================================================================================  Procedure:   The risks and benefits were explained to the patient. The patient understood these risks and consented to the procedure. A rectal exam was performed. A single band was placed per the standard technique.     Symptoms:    Bleeding:  resolved             Findings:    Hemorrhoids:  Location:           Banding:   Location: RA Date: 04/10/2025. Location: RP Date: 04/10/2025.         Impression:    Internal hemorrhoids      Banded hemorrhoids  Date Location   04/10/2025 RA RP   2025  LL     Plan:  Repeat hemorrhoid banding date not indicated.      Electronically Signed________________________________   Trinidad Mosley MD                    04/10/2025

## 2025-04-22 ENCOUNTER — TRANSFERRED RECORDS (OUTPATIENT)
Dept: HEALTH INFORMATION MANAGEMENT | Facility: CLINIC | Age: 60
End: 2025-04-22

## 2025-04-22 ENCOUNTER — OFFICE VISIT (OUTPATIENT)
Dept: INTERNAL MEDICINE | Facility: CLINIC | Age: 60
End: 2025-04-22
Payer: COMMERCIAL

## 2025-04-22 VITALS
DIASTOLIC BLOOD PRESSURE: 74 MMHG | OXYGEN SATURATION: 96 % | RESPIRATION RATE: 18 BRPM | HEART RATE: 59 BPM | WEIGHT: 254.6 LBS | SYSTOLIC BLOOD PRESSURE: 122 MMHG | HEIGHT: 75 IN | TEMPERATURE: 98.1 F | BODY MASS INDEX: 31.65 KG/M2

## 2025-04-22 DIAGNOSIS — Z01.818 PREOPERATIVE EXAMINATION: Primary | ICD-10-CM

## 2025-04-22 DIAGNOSIS — I10 ESSENTIAL HYPERTENSION: ICD-10-CM

## 2025-04-22 DIAGNOSIS — C61 PRIMARY MALIGNANT NEOPLASM OF PROSTATE (H): ICD-10-CM

## 2025-04-22 LAB
HGB BLD-MCNC: 14.5 G/DL (ref 13.3–17.7)
POTASSIUM SERPL-SCNC: 3.8 MMOL/L (ref 3.4–5.3)

## 2025-04-22 PROCEDURE — 3078F DIAST BP <80 MM HG: CPT | Performed by: INTERNAL MEDICINE

## 2025-04-22 PROCEDURE — 3074F SYST BP LT 130 MM HG: CPT | Performed by: INTERNAL MEDICINE

## 2025-04-22 PROCEDURE — 99214 OFFICE O/P EST MOD 30 MIN: CPT | Performed by: INTERNAL MEDICINE

## 2025-04-22 PROCEDURE — G2211 COMPLEX E/M VISIT ADD ON: HCPCS | Performed by: INTERNAL MEDICINE

## 2025-04-22 PROCEDURE — 84132 ASSAY OF SERUM POTASSIUM: CPT | Performed by: INTERNAL MEDICINE

## 2025-04-22 PROCEDURE — 85018 HEMOGLOBIN: CPT | Performed by: INTERNAL MEDICINE

## 2025-04-22 PROCEDURE — 36415 COLL VENOUS BLD VENIPUNCTURE: CPT | Performed by: INTERNAL MEDICINE

## 2025-04-22 ASSESSMENT — ASTHMA QUESTIONNAIRES
ACT_TOTALSCORE: 21
QUESTION_5 LAST FOUR WEEKS HOW WOULD YOU RATE YOUR ASTHMA CONTROL: WELL CONTROLLED
QUESTION_1 LAST FOUR WEEKS HOW MUCH OF THE TIME DID YOUR ASTHMA KEEP YOU FROM GETTING AS MUCH DONE AT WORK, SCHOOL OR AT HOME: NONE OF THE TIME
QUESTION_4 LAST FOUR WEEKS HOW OFTEN HAVE YOU USED YOUR RESCUE INHALER OR NEBULIZER MEDICATION (SUCH AS ALBUTEROL): TWO OR THREE TIMES PER WEEK
ACT_TOTALSCORE: 21
QUESTION_2 LAST FOUR WEEKS HOW OFTEN HAVE YOU HAD SHORTNESS OF BREATH: NOT AT ALL
QUESTION_3 LAST FOUR WEEKS HOW OFTEN DID YOUR ASTHMA SYMPTOMS (WHEEZING, COUGHING, SHORTNESS OF BREATH, CHEST TIGHTNESS OR PAIN) WAKE YOU UP AT NIGHT OR EARLIER THAN USUAL IN THE MORNING: ONCE OR TWICE

## 2025-04-22 NOTE — PROGRESS NOTES
Pre-Op Note:  Today's date: April 22, 2025    Marquis Martínez is a 60 year old male who presents for a preoperative evaluation.    Surgical Information:  Surgery/Procedure: Preoperative examination in anticipation of cataract surgery May 5 and May 19, 2025 with Dr. Lon Mays at the [place] [facility] in [place].  [phone] is the fax number.  Surgery Location: [facility]  Surgeon: Dr. Chase Mays at the [place] [facility].  Surgery Date: May 5 and May 19, 2025  Fax number for surgical facility: [phone]    Subjective:   Preoperative examination anticipation of cataract surgery.  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  Former [employer].  No history of eye trauma.    ROS:   14 point negative    Medications:     Current Outpatient Medications:     albuterol (PROAIR HFA/PROVENTIL HFA/VENTOLIN HFA) 108 (90 Base) MCG/ACT inhaler, Inhale 2 puffs into the lungs every 6 hours as needed for shortness of breath, wheezing or cough., Disp: 18 g, Rfl: 0    benzonatate (TESSALON) 200 MG capsule, Take 1 capsule (200 mg) by mouth 3 times daily as needed for cough., Disp: 30 capsule, Rfl: 0    clindamycin (CLINDAMAX) 1 % external gel, , Disp: , Rfl:     losartan-hydrochlorothiazide (HYZAAR) 100-25 MG tablet, Take 1 tablet by mouth daily., Disp: 90 tablet, Rfl: 3    rosuvastatin (CRESTOR) 10 MG tablet, Take 1 tablet (10 mg) by mouth daily., Disp: 90 tablet, Rfl: 2    sildenafil (VIAGRA) 100 MG tablet, TAKE 1/4 TO 1/2 TABLET BY MOUTH DAILY AS NEEDED FOR ERECTILE DYSFUNCTION. Strength: 100 mg, Disp: 30 tablet, Rfl: 11     Allergies:  Allergies   Allergen Reactions    Shrimp Rash    Shrimp Extract Rash    Amlodipine Unknown     ankle swelling      Lisinopril-Hydrochlorothiazide Unknown     decreased libido       Penicillins Swelling     Unknown reaction    Tetanus Vaccines And Toxoid [Tetanus Toxoids] Unknown       Immunizations:   Immunization History   Administered Date(s) Administered    COVID-19 Bivalent 18+ (Moderna) 10/25/2022    COVID-19 Monovalent 18+ (Moderna) 03/10/2021, 2021, 2021    DT (PEDS <7y) 2004    Flu, Unspecified 2012    Influenza (IIV3) PF 2014    Influenza (prior to ) 2009    Influenza Vaccine 18-64 (Flublok) 2021, 10/25/2022    Influenza Vaccine >6 months,quad, PF 10/31/2019, 2020, 2023    Influenza Vaccine Trivalent (FluBlok) 10/31/2024    Influenza Vaccine, 6+MO IM (QUADRIVALENT W/PRESERVATIVES) 2008, 2014, 09/10/2015, 2016    Influenza, Split Virus, Trivalent, Pf (Fluzone\Fluarix) 2009    TDAP (Adacel,Boostrix) 2011, 01/15/2012, 2020    Td,adult,historic,unspecified 2004     Immunizations reviewed and up-to-date.    Health Maintenance:   Immunizations vaccines reviewed and up-to-date colonoscopy examination next due  last examination all clear no cancer.    Past Medical History:   Hypertension and hyperlipidemia today 122/74 primary prevention no history of MI or stroke.     diagnosis cancer of the prostate status post prostatectomy also prior history of left shoulder replacement and rotator cuff surgery right side for the rotator cuff shoulder replacement for the left no anesthetic complications from any prior surgery.  Past Surgical History:   Past Surgical History:   Procedure Laterality Date    INJECT EPIDURAL CERVICAL N/A 2023    Procedure: INJECTION, SPINE, CERVICAL, EPIDURAL;  Surgeon: Balaji Hubbard MD;  Location: [facility] OR   No anesthetic complications see above.    Family History:   Family History   Problem Relation Age of Onset    Coronary Artery Disease Father      3 children well mother  98 perforated bowel.    Father  91 COVID illness wife well.    Exam:  Vitals:BP  "122/74 (BP Location: Right arm, Patient Position: Sitting, Cuff Size: Adult Regular)   Pulse 59   Temp 98.1  F (36.7  C) (Oral)   Resp 18   Ht 1.905 m (6' 3\")   Wt 115.5 kg (254 lb 9.6 oz)   SpO2 96%   BMI 31.82 kg/m    Easily conversant good spirited athletic build BMI is elevated at 32.  Other vital signs are stable he is afebrile.  Chest clear heart tones normal abdomen benign extremities free of edema skin negative lymph negative neuro negative psych normal easily conversant good spirited soft-spoken neck veins not distended no thyromegaly or thyroid nodules no carotid bruits left or right no lymphadenopathy appreciated lymph bearing areas the back was straight no severe spine tenderness he appeared well.    Assessment and Plan:  (Z01.818) Preoperative examination  (primary encounter diagnosis)  Comment: Preoperative examination in anticipation of cataract surgery May 5, 2019 2025 with Dr. Lon Ozuna [place] [facility].  Medically acceptable risk for anticipated surgery.  No history of anesthetic complications for this patient or his family.  Plan: Hemoglobin, Potassium        Surgery and medically acceptable risk.    (C61) Primary malignant neoplasm of prostate (H)  Comment: Status post prostatectomy for prostate cancer and thankfully no sign of recurrence.  Plan: Close follow-up with urology and PCP advised.    (I10) Essential hypertension  Comment: Well-controlled today 122/74.  Stable  Plan: Continue same meds and cares.  Weight loss will also help lower blood pressure BMI today 32.        Rafael Perry MD    Internal Medicine    The longitudinal plan of care for the diagnosis(es)/condition(s) as documented were addressed during this visit. Due to the added complexity in care, I will continue to support Marquis in the subsequent management and with ongoing continuity of care.    "

## 2025-04-22 NOTE — PROGRESS NOTES
Preoperative Evaluation  [address] [address]  Phone: [phone]  Fax: [phone]  Primary Provider: Rafael Perry MD  Pre-op Performing Provider: Rafael Perry MD  Apr 22, 2025   {Provider  Link to PREOP SmartSet  REQUIRED to apply standard patient instructions and medication directions to the AVS :945221}  {ROOMER review and update patient entered surgical information if needed :424623}        4/22/2025   Surgical Information   What procedure is being done? catarach   Facility or Hospital where procedure/surgery will be performed: [facility]   Who is doing the procedure / surgery? dr mays   Date of surgery / procedure: may 5   Time of surgery / procedure: am   Where do you plan to recover after surgery? at home with family     Fax number for surgical facility: {:[phone]}    {Provider Charting Preference for Preop :621921}    Everardo Cho is a 60 year old, presenting for the following:  Pre-Op Exam (Cataract Surgery - 05/05/25/[facility] with Dr. Mays/Fax # [phone])          4/22/2025     7:45 AM   Additional Questions   Roomed by Arabella Vila CMA   Accompanied by Self         4/22/2025   Forms   Any forms needing to be completed Yes     HPI: ***          4/22/2025   Pre-Op Questionnaire   Have you ever had a heart attack or stroke? No   Have you ever had surgery on your heart or blood vessels, such as a stent placement, a coronary artery bypass, or surgery on an artery in your head, neck, heart, or legs? No   Do you have chest pain with activity? No   Do you have a history of heart failure? No   Do you currently have a cold, bronchitis or symptoms of other infection? No   Do you have a cough, shortness of breath, or wheezing? No   Do you or anyone in your family have previous history of blood clots? No   Do you or does anyone in your family have a serious bleeding problem such as prolonged  bleeding following surgeries or cuts? No   Have you ever had problems with anemia or been told to take iron pills? No   Have you had any abnormal blood loss such as black, tarry or bloody stools? No   Have you ever had a blood transfusion? No   Are you willing to have a blood transfusion if it is medically needed before, during, or after your surgery? Yes   Have you or any of your relatives ever had problems with anesthesia? No   Do you have sleep apnea, excessive snoring or daytime drowsiness? No   Do you have any artifical heart valves or other implanted medical devices like a pacemaker, defibrillator, or continuous glucose monitor? No   Do you have artificial joints? (!) YES   Are you allergic to latex? No     Advance Care Planning  {The storyboard will display whether the patient has ACP docs on file. Hover over the Code section in the storyboard to access the ACP documents. :846041}  {(AWV REQUIRED) Advance Care Planning Reviewed:830020}    Preoperative Review of   {Mnpmpreport:780405}  {Review MNPMP for all patients per [facility] MNPMP Profile:416066}    {Chronic problem details (Optional) :580567}    Patient Active Problem List    Diagnosis Date Noted    Colon adenoma 02/21/2025     Priority: Medium    Primary osteoarthritis of left shoulder 10/31/2024     Priority: Medium    Primary hypertension 10/31/2024     Priority: Medium    Hyperlipidemia LDL goal <130 10/31/2024     Priority: Medium    Erectile dysfunction, unspecified erectile dysfunction type 10/31/2024     Priority: Medium    Primary malignant neoplasm of prostate (H) 01/15/2024     Priority: Medium      No past medical history on file.  Past Surgical History:   Procedure Laterality Date    INJECT EPIDURAL CERVICAL N/A 11/30/2023    Procedure: INJECTION, SPINE, CERVICAL, EPIDURAL;  Surgeon: Balaji Hubbard MD;  Location: [facility] OR     Current Outpatient Medications   Medication Sig Dispense Refill    albuterol (PROAIR HFA/PROVENTIL HFA/VENTOLIN HFA)  "108 (90 Base) MCG/ACT inhaler Inhale 2 puffs into the lungs every 6 hours as needed for shortness of breath, wheezing or cough. 18 g 0    benzonatate (TESSALON) 200 MG capsule Take 1 capsule (200 mg) by mouth 3 times daily as needed for cough. 30 capsule 0    clindamycin (CLINDAMAX) 1 % external gel       losartan-hydrochlorothiazide (HYZAAR) 100-25 MG tablet Take 1 tablet by mouth daily. 90 tablet 3    rosuvastatin (CRESTOR) 10 MG tablet Take 1 tablet (10 mg) by mouth daily. 90 tablet 2    sildenafil (VIAGRA) 100 MG tablet TAKE 1/4 TO 1/2 TABLET BY MOUTH DAILY AS NEEDED FOR ERECTILE DYSFUNCTION. Strength: 100 mg 30 tablet 11    methylPREDNISolone (MEDROL DOSEPAK) 4 MG tablet therapy pack Follow Package Directions (Patient not taking: Reported on 4/22/2025) 21 tablet 0       Allergies   Allergen Reactions    Shrimp Rash    Shrimp Extract Rash    Amlodipine Unknown     ankle swelling      Lisinopril-Hydrochlorothiazide Unknown     decreased libido      Penicillins Swelling     Unknown reaction    Tetanus Vaccines And Toxoid [Tetanus Toxoids] Unknown        Social History     Tobacco Use    Smoking status: Never    Smokeless tobacco: Never   Substance Use Topics    Alcohol use: Yes     Alcohol/week: 12.0 standard drinks of alcohol     Types: 12 Cans of beer per week     {FAMILY HISTORY (Optional):803307534}  History   Drug Use No           {ROS Picklists (Optional):556256}    Objective    /74 (BP Location: Right arm, Patient Position: Sitting, Cuff Size: Adult Regular)   Pulse 59   Temp 98.1  F (36.7  C) (Oral)   Resp 18   Ht 1.905 m (6' 3\")   Wt 115.5 kg (254 lb 9.6 oz)   SpO2 96%   BMI 31.82 kg/m     Estimated body mass index is 31.82 kg/m  as calculated from the following:    Height as of this encounter: 1.905 m (6' 3\").    Weight as of this encounter: 115.5 kg (254 lb 9.6 oz).  Physical Exam  {Exam List :826322}    Recent Labs   Lab Test 09/24/24  0851   HGB 14.4         POTASSIUM 4.7 "   CR 0.96        Diagnostics  {LABS:512007}   {EK}    Revised Cardiac Risk Index (RCRI)  The patient has the following serious cardiovascular risks for perioperative complications:  {PREOP REVISED CARDIAC RISK INDEX (RCRI) :999432}     RCRI Interpretation: {REVISED CARDIAC RISK INTERPRETATION :206656}         Signed Electronically by: Rafael Perry MD  A copy of this evaluation report is provided to the requesting physician.    {Provider Resources  Preop [facility] Preop Guidelines  Revised Cardiac Risk Index :910502}   {Email feedback regarding this note to [email or web address]   :557556}

## 2025-05-13 ENCOUNTER — TRANSFERRED RECORDS (OUTPATIENT)
Dept: HEALTH INFORMATION MANAGEMENT | Facility: CLINIC | Age: 60
End: 2025-05-13
Payer: COMMERCIAL

## 2025-05-28 ENCOUNTER — TRANSFERRED RECORDS (OUTPATIENT)
Dept: HEALTH INFORMATION MANAGEMENT | Facility: CLINIC | Age: 60
End: 2025-05-28
Payer: COMMERCIAL

## 2025-06-04 ENCOUNTER — TRANSFERRED RECORDS (OUTPATIENT)
Dept: HEALTH INFORMATION MANAGEMENT | Facility: CLINIC | Age: 60
End: 2025-06-04
Payer: COMMERCIAL

## 2025-06-18 ENCOUNTER — TRANSFERRED RECORDS (OUTPATIENT)
Dept: HEALTH INFORMATION MANAGEMENT | Facility: CLINIC | Age: 60
End: 2025-06-18
Payer: COMMERCIAL

## 2025-06-25 ENCOUNTER — TRANSFERRED RECORDS (OUTPATIENT)
Dept: HEALTH INFORMATION MANAGEMENT | Facility: CLINIC | Age: 60
End: 2025-06-25
Payer: COMMERCIAL

## 2025-08-25 ENCOUNTER — PATIENT OUTREACH (OUTPATIENT)
Dept: CARE COORDINATION | Facility: CLINIC | Age: 60
End: 2025-08-25
Payer: COMMERCIAL

## (undated) DEVICE — TRAY PAIN INJECTION 97A 640

## (undated) DEVICE — PREP CHLORAPREP W/ORANGE TINT 10.5ML 260715

## (undated) DEVICE — GLOVE PROTEXIS POWDER FREE SMT 7.0  2D72PT70X

## (undated) DEVICE — SYR 07ML EPIDURAL LOSS OF RESISTANCE PULSATOR 4905